# Patient Record
Sex: MALE | Race: WHITE | Employment: FULL TIME | URBAN - METROPOLITAN AREA
[De-identification: names, ages, dates, MRNs, and addresses within clinical notes are randomized per-mention and may not be internally consistent; named-entity substitution may affect disease eponyms.]

---

## 2017-08-14 ENCOUNTER — HOSPITAL ENCOUNTER (OUTPATIENT)
Dept: MRI IMAGING | Age: 37
Discharge: HOME | End: 2017-08-14
Attending: ORTHOPAEDIC SURGERY
Payer: COMMERCIAL

## 2017-08-14 ENCOUNTER — HOSPITAL ENCOUNTER (OUTPATIENT)
Dept: GENERAL RADIOLOGY | Age: 37
Discharge: HOME | End: 2017-08-14
Attending: ORTHOPAEDIC SURGERY
Payer: COMMERCIAL

## 2017-08-14 DIAGNOSIS — M25.512 LEFT SHOULDER PAIN: ICD-10-CM

## 2017-08-14 PROCEDURE — 74011250636 HC RX REV CODE- 250/636: Performed by: RADIOLOGY

## 2017-08-14 PROCEDURE — A9585 GADOBUTROL INJECTION: HCPCS | Performed by: RADIOLOGY

## 2017-08-14 PROCEDURE — 73222 MRI JOINT UPR EXTREM W/DYE: CPT

## 2017-08-14 PROCEDURE — 74011000250 HC RX REV CODE- 250: Performed by: RADIOLOGY

## 2017-08-14 PROCEDURE — 23350 INJECTION FOR SHOULDER X-RAY: CPT

## 2017-08-14 PROCEDURE — 74011636320 HC RX REV CODE- 636/320: Performed by: RADIOLOGY

## 2017-08-14 RX ADMIN — IOHEXOL 20 ML: 300 INJECTION, SOLUTION INTRAVENOUS at 11:00

## 2017-08-14 RX ADMIN — SODIUM BICARBONATE 5 ML: 0.2 INJECTION, SOLUTION INTRAVENOUS at 11:00

## 2017-08-14 RX ADMIN — GADOBUTROL 10 ML: 604.72 INJECTION INTRAVENOUS at 11:00

## 2017-12-01 ENCOUNTER — OFFICE VISIT (OUTPATIENT)
Dept: INTERNAL MEDICINE CLINIC | Age: 37
End: 2017-12-01

## 2017-12-01 VITALS
RESPIRATION RATE: 18 BRPM | BODY MASS INDEX: 33.29 KG/M2 | SYSTOLIC BLOOD PRESSURE: 130 MMHG | OXYGEN SATURATION: 100 % | WEIGHT: 245.8 LBS | HEART RATE: 95 BPM | DIASTOLIC BLOOD PRESSURE: 79 MMHG | HEIGHT: 72 IN | TEMPERATURE: 98.2 F

## 2017-12-01 DIAGNOSIS — Z00.00 GENERAL MEDICAL EXAM: ICD-10-CM

## 2017-12-01 DIAGNOSIS — H66.91 RIGHT OTITIS MEDIA, UNSPECIFIED OTITIS MEDIA TYPE: ICD-10-CM

## 2017-12-01 DIAGNOSIS — F17.200 NEEDS SMOKING CESSATION EDUCATION: ICD-10-CM

## 2017-12-01 DIAGNOSIS — F17.200 SMOKING: Primary | ICD-10-CM

## 2017-12-01 DIAGNOSIS — J40 BRONCHITIS: ICD-10-CM

## 2017-12-01 DIAGNOSIS — Z13.220 SCREENING FOR CHOLESTEROL LEVEL: ICD-10-CM

## 2017-12-01 DIAGNOSIS — Z13.1 SCREENING FOR DIABETES MELLITUS: ICD-10-CM

## 2017-12-01 RX ORDER — BUPROPION HYDROCHLORIDE 150 MG/1
150 TABLET, EXTENDED RELEASE ORAL 2 TIMES DAILY
Qty: 60 TAB | Refills: 2 | Status: SHIPPED | OUTPATIENT
Start: 2017-12-01 | End: 2018-03-05 | Stop reason: SDUPTHER

## 2017-12-01 RX ORDER — AMOXICILLIN AND CLAVULANATE POTASSIUM 875; 125 MG/1; MG/1
1 TABLET, FILM COATED ORAL EVERY 12 HOURS
Qty: 14 TAB | Refills: 0 | Status: SHIPPED | OUTPATIENT
Start: 2017-12-01 | End: 2018-02-26 | Stop reason: ALTCHOICE

## 2017-12-01 NOTE — PROGRESS NOTES
Mr. Jairo Cortez is a new patient who is here to establish care. CC:  Establish Care and Cough (coughing yellowish green mucus x 1 month )       HPI:  Presenting to establish care. Patient also complains of URI symptoms. Having chronic cough yellow brown, wife recently treated for bronchitis. Patient reports \"Started with nasal symptoms and then now in lungs\"   Denies dyspnea, feels generally fatigued and drained. Onset of symptoms 1 month ago  Patient smokes 2 packs per week. Trying to quit. 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[facility]   One son 8 yo /  Wife's my patient also      Review of systems:  Constitutional: negative for fever, chills, weight loss, night sweats   Eyes : negative for vision changes, eye pain and discharge  Nose and Throat: negative for tinnitus, sore throat   Cardiovascular: negative for chest pain, palpitations and shortness of breath  Respiratory: See HPI  Gastroinstestinal: negative for abdominal pain, nausea, vomiting, diarrhea, constipation, and blood in the stool  Musculoskeletal: negative for back ache and joint ache   Genitourinary: negative for dysuria, nocturia, polyuria and hematuria   Neurologic: Negative for focal weakness, numbness or incoordination  Skin: negative for rash, pruritus  Hematologic: negative for easy bruising      Past Medical History:   Diagnosis Date    Tobacco abuse         Past Surgical History:   Procedure Laterality Date    HX OTHER SURGICAL      left shoulder xurgery bone spur    HX VASECTOMY         No Known Allergies    No current outpatient prescriptions on file prior to visit. No current facility-administered medications on file prior to visit. family history includes Cancer in his sister; Diabetes in his mother.     Social History     Social History    Marital status:      Spouse name: N/A    Number of children: N/A    Years of education: N/A     Occupational History    Not on file. Social History Main Topics    Smoking status: Current Some Day Smoker     Packs/day: 0.50    Smokeless tobacco: Never Used    Alcohol use 1.8 oz/week     1 Glasses of wine, 1 Cans of beer, 1 Shots of liquor per week      Comment: Once a day    Drug use: No    Sexual activity: Yes     Partners: Female     Birth control/ protection: None     Other Topics Concern    Not on file     Social History Narrative       Visit Vitals    /79 (BP 1 Location: Left arm, BP Patient Position: Sitting)    Pulse 95    Temp 98.2 °F (36.8 °C) (Oral)    Resp 18    Ht 6' 0.05\" (1.83 m)    Wt 245 lb 12.8 oz (111.5 kg)    SpO2 100%    BMI 33.29 kg/m2     General:  Well appearing male no acute distress  HEENT:   PERRL,normal conjunctiva. Right ear with redness on tympanic membrane. Left is normal. hearing normal to voice. Nose without edema or discharge, normal septum. Lips, teeth, gums normal.  Oropharynx: no erythema, no exudates, no lesions, normal tongue. Neck:  Supple. Thyroid normal size, nontender, without nodules. No carotid bruit. No masses or lymphadenopathy  Respiratory: no respiratory distress,  no wheezing, no rhonchi, no rales. No chest wall tenderness. Cardiovascular:  RRR, normal S1S2, no murmur. Gastrointestinal: normal bowel sounds, soft, nontender, without masses. No hepatosplenomegaly. Extremities +2 pulses, no edema, normal sensation   Musculoskeletal:  Normal gait. Normal digits and nails. Normal strength and tone, no atrophy, and no abnormal movement. Skin:  No rash, no lesions, no ulcers. Skin warm, normal turgor, without induration or nodules. Neuro:  A and OX4, fluent speech, cranial nerves normal 2-12. Sensation normal to light touch.   DTR symmetrical  Psych:  Normal affect      No results found for: WBC, WBCLT, HGBPOC, HGB, HGBP, HCTPOC, HCT, PHCT, RBCH, PLT, MCV, HGBEXT, HCTEXT, PLTEXT, HGBEXT, HCTEXT, PLTEXT  No results found for: NA, K, CL, CO2, AGAP, GLU, BUN, CREA, BUCR, GFRAA, GFRNA, CA, GFRAA  No results found for: CHOL, CHOLPOCT, CHOLX, CHLST, CHOLV, HDL, HDLPOC, LDL, LDLCPOC, LDLC, DLDLP, VLDLC, VLDL, TGLX, TRIGL, TRIGP, TGLPOCT, CHHD, CHHDX  No results found for: TSH, TSH2, TSH3, TSHP, TSHEXT, TSHEXT  No results found for: HBA1C, HGBE8, VHF6GLEC, DIL5TMCT, MHV6RJLO  No results found for: VITD3, XQVID2, XQVID3, XQVID, VD3RIA                Assessment and Plan:     Smoking  Patient wants to quit prescribed Wellbutrin. Previously he was successful quitting on Wellbutrin  - buPROPion SR (WELLBUTRIN SR) 150 mg SR tablet; Take 1 Tab by mouth two (2) times a day. Dispense: 60 Tab; Refill: 2  - CBC WITH AUTOMATED DIFF  - METABOLIC PANEL, COMPREHENSIVE       Bronchitis  Symptoms for over 4 weeks patient with a history of tobacco abuse. - amoxicillin-clavulanate (AUGMENTIN) 875-125 mg per tablet; Take 1 Tab by mouth every twelve (12) hours. Dispense: 14 Tab; Refill: 0  Advised to call if symptoms do not improve   Right otitis media, unspecified otitis media type  - amoxicillin-clavulanate (AUGMENTIN) 875-125 mg per tablet; Take 1 Tab by mouth every twelve (12) hours. Dispense: 14 Tab; Refill: 0     Screening for cholesterol level  - LIPID PANEL     Screening for diabetes mellitus  - HEMOGLOBIN A1C WITH EAG    General medical exam  - CBC WITH AUTOMATED DIFF  - METABOLIC PANEL, COMPREHENSIVE    Follow-up Disposition:  Return in about 6 months (around 6/1/2018) for smoking cessation .      Celso Farley MD

## 2017-12-01 NOTE — LETTER
1/2/2018 12:46 PM 
 
Mr. Alejandrina Munoz [address] [facility] [address] [id number] Dear Alejandrina Munoz: 
 
Please find your most recent results below. Resulted Orders CBC WITH AUTOMATED DIFF Result Value Ref Range WBC 4.2 3.4 - 10.8 x10E3/uL  
 RBC 5.11 4.14 - 5.80 x10E6/uL HGB 16.2 13.0 - 17.7 g/dL HCT 46.3 37.5 - 51.0 % MCV 91 79 - 97 fL  
 MCH 31.7 26.6 - 33.0 pg  
 MCHC 35.0 31.5 - 35.7 g/dL  
 RDW 13.7 12.3 - 15.4 % PLATELET 376 693 - 557 x10E3/uL NEUTROPHILS 57 Not Estab. % Lymphocytes 24 Not Estab. % MONOCYTES 14 Not Estab. % EOSINOPHILS 5 Not Estab. % BASOPHILS 0 Not Estab. %  
 ABS. NEUTROPHILS 2.4 1.4 - 7.0 x10E3/uL Abs Lymphocytes 1.0 0.7 - 3.1 x10E3/uL  
 ABS. MONOCYTES 0.6 0.1 - 0.9 x10E3/uL  
 ABS. EOSINOPHILS 0.2 0.0 - 0.4 x10E3/uL  
 ABS. BASOPHILS 0.0 0.0 - 0.2 x10E3/uL IMMATURE GRANULOCYTES 0 Not Estab. %  
 ABS. IMM. GRANS. 0.0 0.0 - 0.1 x10E3/uL Narrative Performed at:  [address]  [id number] : Apolinar Sanderson MD, Phone:  [phone] LIPID PANEL Result Value Ref Range Cholesterol, total 239 (H) 100 - 199 mg/dL Triglyceride 156 (H) 0 - 149 mg/dL HDL Cholesterol 36 (L) >39 mg/dL VLDL, calculated 31 5 - 40 mg/dL LDL, calculated 172 (H) 0 - 99 mg/dL Narrative Performed at:  [address]  [id number] : Apolinar Sanderson MD, Phone:  [phone] HEMOGLOBIN A1C WITH EAG Result Value Ref Range Hemoglobin A1c 5.0 4.8 - 5.6 % Comment:  
            Pre-diabetes: 5.7 - 6.4 Diabetes: >6.4 Glycemic control for adults with diabetes: <7.0 Estimated average glucose 97 mg/dL Narrative Performed at:  [address]  [id number] : Apolinar Sanderson MD, Phone:  [phone] METABOLIC PANEL, COMPREHENSIVE  
 Result Value Ref Range Glucose 92 65 - 99 mg/dL BUN 8 6 - 20 mg/dL Creatinine 0.93 0.76 - 1.27 mg/dL GFR est non- >59 mL/min/1.73 GFR est  >59 mL/min/1.73  
 BUN/Creatinine ratio 9 9 - 20 Sodium 140 134 - 144 mmol/L Potassium 4.4 3.5 - 5.2 mmol/L Chloride 100 96 - 106 mmol/L  
 CO2 24 18 - 29 mmol/L Calcium 9.8 8.7 - 10.2 mg/dL Protein, total 7.3 6.0 - 8.5 g/dL Albumin 4.4 3.5 - 5.5 g/dL GLOBULIN, TOTAL 2.9 1.5 - 4.5 g/dL A-G Ratio 1.5 1.2 - 2.2 Bilirubin, total 0.6 0.0 - 1.2 mg/dL Alk. phosphatase 74 39 - 117 IU/L  
 AST (SGOT) 33 0 - 40 IU/L  
 ALT (SGPT) 50 (H) 0 - 44 IU/L Narrative Performed at:  [address]  [id number] : Travon Linares MD, Phone:  [phone] RECOMMENDATIONS: 
Kidney function is normal  
Liver function shows ALT is mildly elevated - this is close to normal range repeat at follow up apt Testing for diabetes is negative Cholesterol - Cholesterol: Triglycerides are high ( short term fat storage), HDL good cholesterol is at goal,  LDL which is bad cholesterol is elevated. Recommend increasing  exercise to 30 minutes daily and increased fiber intake - vegetables, fruits and oats and whole grain.  Decreasing fatty food intake. We will repeat cholesterol level in one year if still elevated may need medication Please call me if you have any questions: [phone] Sincerely, Cheikh Willett MD

## 2017-12-01 NOTE — MR AVS SNAPSHOT
Visit Information Date & Time Provider Department Dept. Phone Encounter #  
 12/1/2017  3:00 PM Haley Pozo [address] [place] [address],[address] [phone] [id number] Follow-up Instructions Return in about 6 months (around 6/1/2018) for smoking cessation . Your Appointments 12/1/2017  3:30 PM  
Any with MD Sánchez  
[facility] [address] [facility]) Appt Note: . [facility] [address] [address]  
[address] [facility] [address] [place] [address] Upcoming Health Maintenance Date Due DTaP/Tdap/Td series (2 - Td) 12/1/2024 Allergies as of 12/1/2017  Review Complete On: 12/1/2017 By: Rosalee Barrera LPN No Known Allergies Current Immunizations  Never Reviewed No immunizations on file. Not reviewed this visit You Were Diagnosed With   
  
 Codes Comments Smoking    -  Primary ICD-10-CM: Z04.304 ICD-9-CM: 305.1 Needs smoking cessation education     ICD-10-CM: F17.200 ICD-9-CM: V62.3 Bronchitis     ICD-10-CM: J40 ICD-9-CM: 275 Right otitis media, unspecified otitis media type     ICD-10-CM: H66.91 
ICD-9-CM: 382.9 Screening for cholesterol level     ICD-10-CM: Z13.220 ICD-9-CM: V77.91 Screening for diabetes mellitus     ICD-10-CM: Z13.1 ICD-9-CM: V77.1 General medical exam     ICD-10-CM: Z00.00 ICD-9-CM: V70.9 Vitals BP Pulse Temp Resp Height(growth percentile) Weight(growth percentile) 130/79 (BP 1 Location: Left arm, BP Patient Position: Sitting) 95 98.2 °F (36.8 °C) (Oral) 18 6' 0.05\" (1.83 m) 245 lb 12.8 oz (111.5 kg) SpO2 BMI Smoking Status 100% 33.29 kg/m2 Current Some Day Smoker BMI and BSA Data Body Mass Index Body Surface Area  
 33.29 kg/m 2 2.38 m 2 Preferred Pharmacy Pharmacy Name Phone  [id number] [address], [place] AT AT [facility] [phone] Your Updated Medication List  
  
   
This list is accurate as of: 12/1/17  3:24 PM.  Always use your most recent med list.  
  
  
  
  
 amoxicillin-clavulanate 875-125 mg per tablet Commonly known as:  AUGMENTIN Take 1 Tab by mouth every twelve (12) hours. buPROPion  mg SR tablet Commonly known as:  Andrés Bhat Take 1 Tab by mouth two (2) times a day. Prescriptions Sent to Pharmacy Refills buPROPion SR (WELLBUTRIN SR) 150 mg SR tablet 2 Sig: Take 1 Tab by mouth two (2) times a day. Class: Normal  
 Pharmacy: [address] Ph #: [phone] Route: Oral  
 amoxicillin-clavulanate (AUGMENTIN) 875-125 mg per tablet 0 Sig: Take 1 Tab by mouth every twelve (12) hours. Class: Normal  
 Pharmacy: [address] Ph #: [phone] Route: Oral  
  
We Performed the Following CBC WITH AUTOMATED DIFF [83260 CPT(R)] HEMOGLOBIN A1C WITH EAG [05598 CPT(R)] LIPID PANEL [78055 CPT(R)] METABOLIC PANEL, COMPREHENSIVE [30102 CPT(R)] Follow-up Instructions Return in about 6 months (around 6/1/2018) for smoking cessation . Patient Instructions Stopping Smoking: Care Instructions Your Care Instructions Cigarette smokers crave the nicotine in cigarettes. Giving it up is much harder than simply changing a habit. Your body has to stop craving the nicotine. It is hard to quit, but you can do it. There are many tools that people use to quit smoking. You may find that combining tools works best for you. There are several steps to quitting. First you get ready to quit. Then you get support to help you. After that, you learn new skills and behaviors to become a nonsmoker. For many people, a necessary step is getting and using medicine. Your doctor will help you set up the plan that best meets your needs. You may want to attend a smoking cessation program to help you quit smoking. When you choose a program, look for one that has proven success. Ask your doctor for ideas. You will greatly increase your chances of success if you take medicine as well as get counseling or join a cessation program. 
Some of the changes you feel when you first quit tobacco are uncomfortable. Your body will miss the nicotine at first, and you may feel short-tempered and grumpy. You may have trouble sleeping or concentrating. Medicine can help you deal with these symptoms. You may struggle with changing your smoking habits and rituals. The last step is the tricky one: Be prepared for the smoking urge to continue for a time. This is a lot to deal with, but keep at it. You will feel better. Follow-up care is a key part of your treatment and safety. Be sure to make and go to all appointments, and call your doctor if you are having problems. It's also a good idea to know your test results and keep a list of the medicines you take. How can you care for yourself at home? · Ask your family, friends, and coworkers for support. You have a better chance of quitting if you have help and support. · Join a support group, such as Nicotine Anonymous, for people who are trying to quit smoking. · Consider signing up for a smoking cessation program, such as the American Lung Association's Freedom from Smoking program. 
· Set a quit date. Pick your date carefully so that it is not right in the middle of a big deadline or stressful time. Once you quit, do not even take a puff. Get rid of all ashtrays and lighters after your last cigarette. Clean your house and your clothes so that they do not smell of smoke. · Learn how to be a nonsmoker. Think about ways you can avoid those things that make you reach for a cigarette. ¨ Avoid situations that put you at greatest risk for smoking. For some people, it is hard to have a drink with friends without smoking. For others, they might skip a coffee break with coworkers who smoke. ¨ Change your daily routine. Take a different route to work or eat a meal in a different place. · Cut down on stress. Calm yourself or release tension by doing an activity you enjoy, such as reading a book, taking a hot bath, or gardening. · Talk to your doctor or pharmacist about nicotine replacement therapy, which replaces the nicotine in your body. You still get nicotine but you do not use tobacco. Nicotine replacement products help you slowly reduce the amount of nicotine you need. These products come in several forms, many of them available over-the-counter: ¨ Nicotine patches ¨ Nicotine gum and lozenges ¨ Nicotine inhaler · Ask your doctor about bupropion (Wellbutrin) or varenicline (Chantix), which are prescription medicines. They do not contain nicotine. They help you by reducing withdrawal symptoms, such as stress and anxiety. · Some people find hypnosis, acupuncture, and massage helpful for ending the smoking habit. · Eat a healthy diet and get regular exercise. Having healthy habits will help your body move past its craving for nicotine. · Be prepared to keep trying. Most people are not successful the first few times they try to quit. Do not get mad at yourself if you smoke again. Make a list of things you learned and think about when you want to try again, such as next week, next month, or next year. Where can you learn more? Go to http://michelle-kirk.info/. Enter P499 in the search box to learn more about \"Stopping Smoking: Care Instructions. \" Current as of: March 20, 2017 Content Version: 11.4 © 1437-4715 [employer], [employer].  Care instructions adapted under license by [employer] (which disclaims liability or warranty for this information). If you have questions about a medical condition or this instruction, always ask your healthcare professional. [address] any warranty or liability for your use of this information. Introducing \[facility]\"" HEALTH SERVICES! [facility] introduces InstaEDU patient portal. Now you can access parts of your medical record, email your doctor's office, and request medication refills online. 1. In your internet browser, go to [email or web address] 2. Click on the First Time User? Click Here link in the Sign In box. You will see the New Member Sign Up page. 3. Enter your InstaEDU Access Code exactly as it appears below. You will not need to use this code after youve completed the sign-up process. If you do not sign up before the expiration date, you must request a new code. · InstaEDU Access Code: [email or web address] Expires: 3/1/2018  3:24 PM 
 
4. Enter the last four digits of your Social Security Number (xxxx) and Date of Birth (mm/dd/yyyy) as indicated and click Submit. You will be taken to the next sign-up page. 5. Create a InstaEDU ID. This will be your InstaEDU login ID and cannot be changed, so think of one that is secure and easy to remember. 6. Create a InstaEDU password. You can change your password at any time. 7. Enter your Password Reset Question and Answer. This can be used at a later time if you forget your password. 8. Enter your e-mail address. You will receive e-mail notification when new information is available in [address] 9. Click Sign Up. You can now view and download portions of your medical record. 10. Click the Download Summary menu link to download a portable copy of your medical information. If you have questions, please visit the Frequently Asked Questions section of the InstaEDU website. Remember, InstaEDU is NOT to be used for urgent needs. For medical emergencies, dial 911. Now available from your iPhone and Android! Please provide this summary of care documentation to your next provider. Your primary care clinician is listed as VINCENZO Patel. If you have any questions after today's visit, please call [phone].

## 2017-12-01 NOTE — PATIENT INSTRUCTIONS

## 2017-12-01 NOTE — PROGRESS NOTES
Chief Complaint   Patient presents with    Establish Care    Cough     coughing yellowish green mucus x 1 month      Visit Vitals    /79 (BP 1 Location: Left arm, BP Patient Position: Sitting)    Pulse 95    Temp 98.2 °F (36.8 °C) (Oral)    Resp 18    Ht 6' 0.05\" (1.83 m)    Wt 245 lb 12.8 oz (111.5 kg)    SpO2 100%    BMI 33.29 kg/m2     Reviewed record In preparation for visit and have obtained necessary documentation    1. Have you been to the ER, urgent care clinic since your last visit? Hospitalized since your last visit? NO    2. Have you seen or consulted any other health care providers outside of the [address] [place] [address] since your last visit? Include any pap smears or colon screening. NO    Patient does not have advance directive on file and has received paperwork.

## 2017-12-29 ENCOUNTER — APPOINTMENT (OUTPATIENT)
Dept: INTERNAL MEDICINE CLINIC | Age: 37
End: 2017-12-29

## 2017-12-30 LAB
ALBUMIN SERPL-MCNC: 4.4 G/DL (ref 3.5–5.5)
ALBUMIN/GLOB SERPL: 1.5 {RATIO} (ref 1.2–2.2)
ALP SERPL-CCNC: 74 IU/L (ref 39–117)
ALT SERPL-CCNC: 50 IU/L (ref 0–44)
AST SERPL-CCNC: 33 IU/L (ref 0–40)
BASOPHILS # BLD AUTO: 0 X10E3/UL (ref 0–0.2)
BASOPHILS NFR BLD AUTO: 0 %
BILIRUB SERPL-MCNC: 0.6 MG/DL (ref 0–1.2)
BUN SERPL-MCNC: 8 MG/DL (ref 6–20)
BUN/CREAT SERPL: 9 (ref 9–20)
CALCIUM SERPL-MCNC: 9.8 MG/DL (ref 8.7–10.2)
CHLORIDE SERPL-SCNC: 100 MMOL/L (ref 96–106)
CHOLEST SERPL-MCNC: 239 MG/DL (ref 100–199)
CO2 SERPL-SCNC: 24 MMOL/L (ref 18–29)
CREAT SERPL-MCNC: 0.93 MG/DL (ref 0.76–1.27)
EOSINOPHIL # BLD AUTO: 0.2 X10E3/UL (ref 0–0.4)
EOSINOPHIL NFR BLD AUTO: 5 %
ERYTHROCYTE [DISTWIDTH] IN BLOOD BY AUTOMATED COUNT: 13.7 % (ref 12.3–15.4)
EST. AVERAGE GLUCOSE BLD GHB EST-MCNC: 97 MG/DL
GLOBULIN SER CALC-MCNC: 2.9 G/DL (ref 1.5–4.5)
GLUCOSE SERPL-MCNC: 92 MG/DL (ref 65–99)
HBA1C MFR BLD: 5 % (ref 4.8–5.6)
HCT VFR BLD AUTO: 46.3 % (ref 37.5–51)
HDLC SERPL-MCNC: 36 MG/DL
HGB BLD-MCNC: 16.2 G/DL (ref 13–17.7)
IMM GRANULOCYTES # BLD: 0 X10E3/UL (ref 0–0.1)
IMM GRANULOCYTES NFR BLD: 0 %
LDLC SERPL CALC-MCNC: 172 MG/DL (ref 0–99)
LYMPHOCYTES # BLD AUTO: 1 X10E3/UL (ref 0.7–3.1)
LYMPHOCYTES NFR BLD AUTO: 24 %
MCH RBC QN AUTO: 31.7 PG (ref 26.6–33)
MCHC RBC AUTO-ENTMCNC: 35 G/DL (ref 31.5–35.7)
MCV RBC AUTO: 91 FL (ref 79–97)
MONOCYTES # BLD AUTO: 0.6 X10E3/UL (ref 0.1–0.9)
MONOCYTES NFR BLD AUTO: 14 %
NEUTROPHILS # BLD AUTO: 2.4 X10E3/UL (ref 1.4–7)
NEUTROPHILS NFR BLD AUTO: 57 %
PLATELET # BLD AUTO: 245 X10E3/UL (ref 150–379)
POTASSIUM SERPL-SCNC: 4.4 MMOL/L (ref 3.5–5.2)
PROT SERPL-MCNC: 7.3 G/DL (ref 6–8.5)
RBC # BLD AUTO: 5.11 X10E6/UL (ref 4.14–5.8)
SODIUM SERPL-SCNC: 140 MMOL/L (ref 134–144)
TRIGL SERPL-MCNC: 156 MG/DL (ref 0–149)
VLDLC SERPL CALC-MCNC: 31 MG/DL (ref 5–40)
WBC # BLD AUTO: 4.2 X10E3/UL (ref 3.4–10.8)

## 2017-12-31 NOTE — PROGRESS NOTES
Kidney function is normal   Liver function shows ALT is mildly elevated - this is close to normal range repeat at follow up apt  Testing for diabetes is negative  Cholesterol - Cholesterol: Triglycerides are high ( short term fat storage), HDL good cholesterol is at goal,  LDL which is bad cholesterol is elevated. Recommend increasing  exercise to 30 minutes daily and increased fiber intake - vegetables, fruits and oats and whole grain. Decreasing fatty food intake.  We will repeat cholesterol level in one year if still elevated may need medication

## 2018-02-26 ENCOUNTER — OFFICE VISIT (OUTPATIENT)
Dept: INTERNAL MEDICINE CLINIC | Age: 38
End: 2018-02-26

## 2018-02-26 VITALS
OXYGEN SATURATION: 99 % | DIASTOLIC BLOOD PRESSURE: 83 MMHG | RESPIRATION RATE: 18 BRPM | BODY MASS INDEX: 32.91 KG/M2 | TEMPERATURE: 97.8 F | HEIGHT: 72 IN | SYSTOLIC BLOOD PRESSURE: 133 MMHG | HEART RATE: 99 BPM | WEIGHT: 243 LBS

## 2018-02-26 DIAGNOSIS — R74.01 ELEVATED ALT MEASUREMENT: ICD-10-CM

## 2018-02-26 DIAGNOSIS — E78.00 HIGH CHOLESTEROL: ICD-10-CM

## 2018-02-26 DIAGNOSIS — M77.9 TENDINITIS: Primary | ICD-10-CM

## 2018-02-26 RX ORDER — NAPROXEN 500 MG/1
500 TABLET ORAL 2 TIMES DAILY WITH MEALS
Qty: 30 TAB | Refills: 1 | Status: SHIPPED | OUTPATIENT
Start: 2018-02-26 | End: 2021-10-12

## 2018-02-26 NOTE — MR AVS SNAPSHOT
[address] [facility] 
[phone] Patient: Dewey Rasmussen MRN: [id number] HTB:[id number] Visit Information Date & Time Provider Department Dept. Phone Encounter #  
 2/26/2018  9:15 AM Sánchez, [address] [facility] [phone] [id number] Follow-up Instructions Return in about 6 months (around 8/26/2018) for cholesterol. Your Appointments 6/1/2018  9:45 AM  
ROUTINE CARE with Hilda Payne MD  
[facility]) Appt Note: 6 month follow up  
 [address] [address]  
[address] [place] [address] [address] [place] [address] Upcoming Health Maintenance Date Due DTaP/Tdap/Td series (2 - Td) 12/1/2024 Allergies as of 2/26/2018  Review Complete On: 2/26/2018 By: MD Sánchez  
 No Known Allergies Current Immunizations  Never Reviewed No immunizations on file. Not reviewed this visit You Were Diagnosed With   
  
 Codes Comments Tendinitis    -  Primary ICD-10-CM: M77.9 ICD-9-CM: 726.90 High cholesterol     ICD-10-CM: E78.00 ICD-9-CM: 272.0 Elevated ALT measurement     ICD-10-CM: R74.0 ICD-9-CM: 790.4 Vitals BP Pulse Temp Resp Height(growth percentile) Weight(growth percentile) 133/83 (BP 1 Location: Right arm, BP Patient Position: Sitting) 99 97.8 °F (36.6 °C) (Oral) 18 6' 0.05\" (1.83 m) 243 lb (110.2 kg) SpO2 BMI Smoking Status 99% 32.91 kg/m2 Former Smoker BMI and BSA Data Body Mass Index Body Surface Area  
 32.91 kg/m 2 2.37 m 2 Preferred Pharmacy Pharmacy Name Phone [id number] [phone] Your Updated Medication List  
  
   
 This list is accurate as of 2/26/18  9:31 AM.  Always use your most recent med list.  
  
  
  
  
 buPROPion  mg SR tablet Commonly known as:  Mukul Hilts Take 1 Tab by mouth two (2) times a day. naproxen 500 mg tablet Commonly known as:  NAPROSYN Take 1 Tab by mouth two (2) times daily (with meals). For one week/ take it with food Prescriptions Sent to Pharmacy Refills  
 naproxen (NAPROSYN) 500 mg tablet 1 Sig: Take 1 Tab by mouth two (2) times daily (with meals). For one week/ take it with food Class: Normal  
 Pharmacy: [place] [id number], [address] [place] [address] #: [phone] Route: Oral  
  
Follow-up Instructions Return in about 6 months (around 8/26/2018) for cholesterol. To-Do List   
 02/26/2018 Lab:  LIPID PANEL   
  
 02/26/2018 Lab:  METABOLIC PANEL, COMPREHENSIVE Patient Instructions Wrist Tendinitis: Exercises Your Care Instructions Here are some examples of typical rehabilitation exercises for your condition. Start each exercise slowly. Ease off the exercise if you start to have pain. Your doctor or your physical or occupational therapist will tell you when you can start these exercises and which ones will work best for you. How to do the exercises Wrist flexion and extension 1. Place your forearm on a table, with your hand and affected wrist extended beyond the table, palm down. 2. Bend your wrist to move your hand upward and allow your hand to close into a fist, then lower your hand and allow your fingers to relax. Hold each position for about 6 seconds. 3. Repeat 8 to 12 times. Hand flips 1. While seated, place your forearm and affected wrist on your thigh, palm down. 2. Flip your hand over so the back of your hand rests on your thigh and your palm is up. Alternate between palm up and palm down while keeping your forearm on your thigh. 3. Repeat 8 to 12 times. Wrist radial and ulnar deviation 1. Hold your affected hand out in front of you, palm down. 2. Slowly bend your wrist as far as you can from side to side. Hold each position for about 6 seconds. 3. Repeat 8 to 12 times. Wrist extensor stretch 1. Extend the arm with the affected wrist in front of you and point your fingers toward the floor. 2. With your other hand, gently bend your wrist farther until you feel a mild to moderate stretch in your forearm. 3. Hold the stretch for at least 15 to 30 seconds. 4. Repeat 2 to 4 times. 5. When you can do this stretch with ease and no pain, repeat steps 1 through 4. But this time extend your affected arm in front of you and make a fist with your palm facing down. Then bend your wrist, pointing your fist toward the floor. Wrist flexor stretch 1. Extend the arm with the affected wrist in front of you with your palm facing away from your body. 2. Bend back your wrist, pointing your hand up toward the ceiling. 3. With your other hand, gently bend your wrist farther until you feel a mild to moderate stretch in your forearm. 4. Hold the stretch for at least 15 to 30 seconds. 5. Repeat 2 to 4 times. 6. Repeat steps 1 through 5, but this time extend your affected arm in front of you with your palm facing up. Then bend back your wrist, pointing your hand toward the floor. Follow-up care is a key part of your treatment and safety. Be sure to make and go to all appointments, and call your doctor if you are having problems. It's also a good idea to know your test results and keep a list of the medicines you take. Where can you learn more? Go to http://michelle-kirk.info/. Enter C662 in the search box to learn more about \"Wrist Tendinitis: Exercises. \" Current as of: March 21, 2017 Content Version: 11.4 © 9783-7457 Healthwise, Incorporated.  Care instructions adapted under license by Eloy Patel (which disclaims liability or warranty for this information). If you have questions about a medical condition or this instruction, always ask your healthcare professional. [address] any warranty or liability for your use of this information. 
-------------------------------------- Congratulation on quitting smoking Repeat cholesterol and liver enzymes in June Introducing [facility]! Dear Rian Cuadra: Thank you for requesting a [employer] account. Our records indicate that you already have an active [employer] account. You can access your account anytime at [email or web address] Did you know that you can access your hospital and ER discharge instructions at any time in 51[employer]? You can also review all of your test results from your hospital stay or ER visit. Additional Information If you have questions, please visit the Frequently Asked Questions section of the [employer] website at [email or web address] Remember, 51[employer] is NOT to be used for urgent needs. For medical emergencies, dial 911. Now available from your iPhone and Android! Please provide this summary of care documentation to your next provider. Your primary care clinician is listed as VINCENZO Patel. If you have any questions after today's visit, please call [phone].

## 2018-02-26 NOTE — PROGRESS NOTES
CC: Hand Pain (both hands) and Warts (left hand pointer finger)      HPI:    He is a 40 y.o. male who presents for evaluation of hand pain and warts    He las seen on December and at that time noted to have elevated cholesterol LDL was 170. Has implemeted lifestyle changes and eating lower fat diet. Tobacco abuse: able to quit. Patient is taking Wellbutrin and has stopped smoking. Congratulated patient    Hand pain- noted two weeks of pain in his left hand mostly - points to dorsal aspect / extensor tendons  Dawit stiffness and swelling. Noted pain for two weeks then stopped hurting - currently denies pain  Took ibuprofen - unsure if helped  Denies trauma but works with his hands    Hx of warts in hands and has noted warts below nail in both hands    ROS:  10 systems reviewed and negative other than HPI    Past Medical History:   Diagnosis Date    Tobacco abuse        Current Outpatient Prescriptions on File Prior to Visit   Medication Sig Dispense Refill    buPROPion SR (WELLBUTRIN SR) 150 mg SR tablet Take 1 Tab by mouth two (2) times a day. 60 Tab 2     No current facility-administered medications on file prior to visit. Past Surgical History:   Procedure Laterality Date    HX OTHER SURGICAL      left shoulder xurgery bone spur    HX VASECTOMY         Family History   Problem Relation Age of Onset    Diabetes Mother     Cancer Sister      breast      Reviewed and no changes     Social History     Social History    Marital status:      Spouse name: N/A    Number of children: N/A    Years of education: N/A     Occupational History    Not on file.      Social History Main Topics    Smoking status: Former Smoker     Packs/day: 0.50    Smokeless tobacco: Never Used    Alcohol use 1.8 oz/week     1 Glasses of wine, 1 Cans of beer, 1 Shots of liquor per week      Comment: Once a day    Drug use: No    Sexual activity: Yes     Partners: Female     Birth control/ protection: None     Other Topics Concern    Not on file     Social History Narrative            Visit Vitals    /83 (BP 1 Location: Right arm, BP Patient Position: Sitting)    Pulse 99    Temp 97.8 °F (36.6 °C) (Oral)    Resp 18    Ht 6' 0.05\" (1.83 m)    Wt 243 lb (110.2 kg)    SpO2 99%    BMI 32.91 kg/m2       Physical Examination:   General - Well appearing male  HEENT - PERRL, TM no erythema/opacification, normal nasal turbinates, oropharynx no erythema or exudate, MMM  Neck - supple, no bruits, no TMG, no LAD  Pulm - clear to auscultation bilaterally  Cardio - RRR, normal S1 S2, no murmur gallops or rubs  Abd - soft, nontender, no masses, no HSM  Extrem - no edema, +2 distal pulses  Psych - normal affect, appropriate mood  Hand exam is normal, no tenderness over wrists, MCPs and DIPS and PIPS   Skin: wart on left first digit and right digit    Lab Results   Component Value Date/Time    WBC 4.2 12/29/2017 08:27 AM    HGB 16.2 12/29/2017 08:27 AM    HCT 46.3 12/29/2017 08:27 AM    PLATELET 186 07/58/7972 08:27 AM    MCV 91 12/29/2017 08:27 AM     Lab Results   Component Value Date/Time    Sodium 140 12/29/2017 08:27 AM    Potassium 4.4 12/29/2017 08:27 AM    Chloride 100 12/29/2017 08:27 AM    CO2 24 12/29/2017 08:27 AM    Glucose 92 12/29/2017 08:27 AM    BUN 8 12/29/2017 08:27 AM    Creatinine 0.93 12/29/2017 08:27 AM    BUN/Creatinine ratio 9 12/29/2017 08:27 AM    GFR est  12/29/2017 08:27 AM    GFR est non- 12/29/2017 08:27 AM    Calcium 9.8 12/29/2017 08:27 AM     Lab Results   Component Value Date/Time    Cholesterol, total 239 (H) 12/29/2017 08:27 AM    HDL Cholesterol 36 (L) 12/29/2017 08:27 AM    LDL, calculated 172 (H) 12/29/2017 08:27 AM    VLDL, calculated 31 12/29/2017 08:27 AM    Triglyceride 156 (H) 12/29/2017 08:27 AM     No results found for: TSH, TSH2, TSH3, TSHP, TSHEXT, TSHEXT  No results found for: KAIT, Misti Burt, PSAR3, [id number], [id number], PSALT  Lab Results Component Value Date/Time    Hemoglobin A1c 5.0 12/29/2017 08:27 AM     No results found for: Brent Alvarado, [id number]    Lab Results   Component Value Date/Time    ALT (SGPT) 50 (H) 12/29/2017 08:27 AM    AST (SGOT) 33 12/29/2017 08:27 AM    Alk. phosphatase 74 12/29/2017 08:27 AM    Bilirubin, total 0.6 12/29/2017 08:27 AM           Assessment/Plan:    41 yo male with a hx of tobacco abuse, high cholesterol, tobacco abuse presenting with hand pain   1. High cholesterol  - patient to return for fasting labs in the summer  - LIPID PANEL; Future  - METABOLIC PANEL, COMPREHENSIVE; Future    2. Elevated ALT measurement  - METABOLIC PANEL, COMPREHENSIVE; Future    3. Tendinitis of left hand  -suspect related to over use  At work. Improving. Given exercises/ stretches. NSAID for 1 week. Advised to call if symptoms worsen  - naproxen (NAPROSYN) 500 mg tablet; Take 1 Tab by mouth two (2) times daily (with meals). For one week/ take it with food  Dispense: 30 Tab; Refill: 1    4. Tobacco abuse: quit smoking- congratulated patient. Currently on wellbutrin    Follow-up Disposition:  Return in about 6 months (around 8/26/2018) for cholesterol.     Vanessa Gifford MD

## 2018-02-26 NOTE — PROGRESS NOTES
Chief Complaint   Patient presents with    Hand Pain     both hands    Warts     left hand pointer finger     1. Have you been to the ER, urgent care clinic since your last visit? Hospitalized since your last visit? No    2. Have you seen or consulted any other health care providers outside of the [address] [place] [address] since your last visit? Include any pap smears or colon screening.  No

## 2018-02-26 NOTE — PATIENT INSTRUCTIONS
Wrist Tendinitis: Exercises  Your Care Instructions  Here are some examples of typical rehabilitation exercises for your condition. Start each exercise slowly. Ease off the exercise if you start to have pain. Your doctor or your physical or occupational therapist will tell you when you can start these exercises and which ones will work best for you. How to do the exercises  Wrist flexion and extension    1. Place your forearm on a table, with your hand and affected wrist extended beyond the table, palm down. 2. Bend your wrist to move your hand upward and allow your hand to close into a fist, then lower your hand and allow your fingers to relax. Hold each position for about 6 seconds. 3. Repeat 8 to 12 times. Hand flips    1. While seated, place your forearm and affected wrist on your thigh, palm down. 2. Flip your hand over so the back of your hand rests on your thigh and your palm is up. Alternate between palm up and palm down while keeping your forearm on your thigh. 3. Repeat 8 to 12 times. Wrist radial and ulnar deviation    1. Hold your affected hand out in front of you, palm down. 2. Slowly bend your wrist as far as you can from side to side. Hold each position for about 6 seconds. 3. Repeat 8 to 12 times. Wrist extensor stretch    1. Extend the arm with the affected wrist in front of you and point your fingers toward the floor. 2. With your other hand, gently bend your wrist farther until you feel a mild to moderate stretch in your forearm. 3. Hold the stretch for at least 15 to 30 seconds. 4. Repeat 2 to 4 times. 5. When you can do this stretch with ease and no pain, repeat steps 1 through 4. But this time extend your affected arm in front of you and make a fist with your palm facing down. Then bend your wrist, pointing your fist toward the floor. Wrist flexor stretch    1. Extend the arm with the affected wrist in front of you with your palm facing away from your body.   2. Bend back your wrist, pointing your hand up toward the ceiling. 3. With your other hand, gently bend your wrist farther until you feel a mild to moderate stretch in your forearm. 4. Hold the stretch for at least 15 to 30 seconds. 5. Repeat 2 to 4 times. 6. Repeat steps 1 through 5, but this time extend your affected arm in front of you with your palm facing up. Then bend back your wrist, pointing your hand toward the floor. Follow-up care is a key part of your treatment and safety. Be sure to make and go to all appointments, and call your doctor if you are having problems. It's also a good idea to know your test results and keep a list of the medicines you take. Where can you learn more? Go to http://michelle-kirk.info/. Enter I116 in the search box to learn more about \"Wrist Tendinitis: Exercises. \"  Current as of: March 21, 2017  Content Version: 11.4  © 3983-3062 [employer]. Care instructions adapted under license by [employer] (which disclaims liability or warranty for this information).  If you have questions about a medical condition or this instruction, always ask your healthcare professional. [place] [address] any warranty or liability for your use of this information.  --------------------------------------    Congratulation on quitting smoking     Repeat cholesterol and liver enzymes in June

## 2018-03-05 DIAGNOSIS — F17.200 SMOKING: ICD-10-CM

## 2018-03-05 DIAGNOSIS — F17.200 NEEDS SMOKING CESSATION EDUCATION: ICD-10-CM

## 2018-03-05 RX ORDER — BUPROPION HYDROCHLORIDE 150 MG/1
TABLET, EXTENDED RELEASE ORAL
Qty: 60 TAB | Refills: 2 | Status: SHIPPED | OUTPATIENT
Start: 2018-03-05 | End: 2018-07-08 | Stop reason: SDUPTHER

## 2018-07-08 DIAGNOSIS — F17.200 SMOKING: ICD-10-CM

## 2018-07-08 DIAGNOSIS — F17.200 NEEDS SMOKING CESSATION EDUCATION: ICD-10-CM

## 2018-07-09 RX ORDER — BUPROPION HYDROCHLORIDE 150 MG/1
TABLET, EXTENDED RELEASE ORAL
Qty: 60 TAB | Refills: 2 | Status: SHIPPED | OUTPATIENT
Start: 2018-07-09 | End: 2021-10-12

## 2021-10-12 ENCOUNTER — OFFICE VISIT (OUTPATIENT)
Dept: INTERNAL MEDICINE CLINIC | Age: 41
End: 2021-10-12
Payer: COMMERCIAL

## 2021-10-12 VITALS
SYSTOLIC BLOOD PRESSURE: 121 MMHG | HEART RATE: 84 BPM | HEIGHT: 72 IN | BODY MASS INDEX: 33.59 KG/M2 | TEMPERATURE: 97.7 F | RESPIRATION RATE: 14 BRPM | OXYGEN SATURATION: 99 % | DIASTOLIC BLOOD PRESSURE: 77 MMHG | WEIGHT: 248 LBS

## 2021-10-12 DIAGNOSIS — Z13.220 SCREENING, LIPID: ICD-10-CM

## 2021-10-12 DIAGNOSIS — R21 RASH: Primary | ICD-10-CM

## 2021-10-12 DIAGNOSIS — Z11.59 ENCOUNTER FOR HEPATITIS C SCREENING TEST FOR LOW RISK PATIENT: ICD-10-CM

## 2021-10-12 PROCEDURE — 99203 OFFICE O/P NEW LOW 30 MIN: CPT | Performed by: INTERNAL MEDICINE

## 2021-10-12 NOTE — PROGRESS NOTES
PROGRESS NOTE  Name: Maryjane Stanley   : 1980       ASSESSMENT/ PLAN:     Diagnoses and all orders for this visit:    Rash  -     REFERRAL TO DERMATOLOGY  -     METABOLIC PANEL, COMPREHENSIVE; Future  -     CBC WITH AUTOMATED DIFF; Future  -     SED RATE (ESR); Future  -     BASILIO BY MULTIPLEX FLOW IA, QL; Future  -     METABOLIC PANEL, COMPREHENSIVE  -     CBC WITH AUTOMATED DIFF  -     SED RATE (ESR)  -     BASILIO BY MULTIPLEX FLOW IA, QL    Screening, lipid  -     LIPID PANEL; Future  -     LIPID PANEL    Encounter for hepatitis C screening test for low risk patient  -     HEPATITIS C AB; Future  -     HEPATITIS C AB        Follow-up and Dispositions  ·   Return in about 1 year (around 10/12/2022) for CPE. I have reviewed the patient's medications and risks/side effects/benefits were discussed. Diagnosis(-es) explained to patient and questions answered. Literature provided where appropriate. SUBJECTIVE:   Mr. Maryjane Stanley is a 36 y.o. male who is here for follow up of routine medical issues. Chief Complaint   Patient presents with    New Patient       In the past year, he has developed a petechial rash, starting on back, and now spread to upper back, and legs. No itching. No known exposures to unusual or toxic agents. He has seen urologist for BPH. At this time, he is otherwise doing well and has brought no other complaints to my attention today. For a list of the medical issues addressed today, see the assessment and plan below. PMH:   Past Medical History:   Diagnosis Date    Tobacco abuse        Past Surgical History:   Procedure Laterality Date    HX OTHER SURGICAL      left shoulder xurgery bone spur    HX VASECTOMY         All: He has No Known Allergies.    Current Outpatient Medications   Medication Sig    buPROPion SR (WELLBUTRIN SR) 150 mg SR tablet TAKE 1 TABLET BY MOUTH TWICE A DAY (Patient not taking: Reported on 10/12/2021)    naproxen (NAPROSYN) 500 mg tablet Take 1 Tab by mouth two (2) times daily (with meals). For one week/ take it with food (Patient not taking: Reported on 10/12/2021)     No current facility-administered medications for this visit. FH: His family history includes Cancer in his sister; Diabetes in his mother. SH: He is an , for [address]. He reports that he has quit smoking. He smoked 0.50 packs per day. He has never used smokeless tobacco. He reports current alcohol use of about 3.0 standard drinks of alcohol per week. He reports that he does not use drugs. ROS: See above; Complete ROS otherwise negative. OBJECTIVE:   Vitals:   Visit Vitals  /77 (BP 1 Location: Left arm, BP Patient Position: Sitting, BP Cuff Size: Adult)   Pulse 84   Temp 97.7 °F (36.5 °C) (Temporal)   Resp 14   Ht 6' (1.829 m)   Wt 248 lb (112.5 kg)   SpO2 99%   BMI 33.63 kg/m²      Gen: Pleasant 36 y.o.  male in NAD. HEENT: PERRLA. EOMI. OP moist and pink. Neck: Supple. No LAD. HEART: RRR, No M/G/R.    LUNGS: CTAB No W/R. ABDOMEN: S, NT, ND, BS+. EXTREMITIES: Warm. No C/C/E.  MUSCULOSKELETAL: Normal ROM, muscle strength 5/5 all groups. NEURO: Alert and oriented x 3. Cranial nerves grossly intact. No focal sensory or motor deficits noted. SKIN: Warm. Dry. He has a macular rash of Low back prominently, with scattered lesions on upper back/shoulders, and proximal legs. Lab Results   Component Value Date/Time    Sodium 140 12/29/2017 08:27 AM    Potassium 4.4 12/29/2017 08:27 AM    Chloride 100 12/29/2017 08:27 AM    CO2 24 12/29/2017 08:27 AM    Glucose 92 12/29/2017 08:27 AM    BUN 8 12/29/2017 08:27 AM    Creatinine 0.93 12/29/2017 08:27 AM    BUN/Creatinine ratio 9 12/29/2017 08:27 AM    GFR est  12/29/2017 08:27 AM    GFR est non- 12/29/2017 08:27 AM    Calcium 9.8 12/29/2017 08:27 AM    Bilirubin, total 0.6 12/29/2017 08:27 AM    ALT (SGPT) 50 (H) 12/29/2017 08:27 AM    Alk.  phosphatase 74 12/29/2017 08:27 AM    Protein, total 7.3 12/29/2017 08:27 AM    Albumin 4.4 12/29/2017 08:27 AM    A-G Ratio 1.5 12/29/2017 08:27 AM       Lab Results   Component Value Date/Time    Cholesterol, total 239 (H) 12/29/2017 08:27 AM    HDL Cholesterol 36 (L) 12/29/2017 08:27 AM    LDL, calculated 172 (H) 12/29/2017 08:27 AM    Triglyceride 156 (H) 12/29/2017 08:27 AM        Lab Results   Component Value Date/Time    Hemoglobin A1c 5.0 12/29/2017 08:27 AM       Lab Results   Component Value Date/Time    WBC 4.2 12/29/2017 08:27 AM    HGB 16.2 12/29/2017 08:27 AM    HCT 46.3 12/29/2017 08:27 AM    PLATELET 323 72/88/2035 08:27 AM    MCV 91 12/29/2017 08:27 AM

## 2021-10-12 NOTE — PROGRESS NOTES
Chief Complaint   Patient presents with    New Patient     1. Have you been to the ER, urgent care clinic since your last visit? Hospitalized since your last visit? No    2. Have you seen or consulted any other health care providers outside of the [address] [place] [address] since your last visit? Include any pap smears or colon screening.  No     Visit Vitals  /77 (BP 1 Location: Left arm, BP Patient Position: Sitting, BP Cuff Size: Adult)   Pulse 84   Temp 97.7 °F (36.5 °C) (Temporal)   Resp 14   Ht 6' (1.829 m)   Wt 248 lb (112.5 kg)   SpO2 99%   BMI 33.63 kg/m²

## 2021-10-21 LAB
ALBUMIN SERPL-MCNC: 4.9 G/DL (ref 4–5)
ALBUMIN/GLOB SERPL: 2 {RATIO} (ref 1.2–2.2)
ALP SERPL-CCNC: 61 IU/L (ref 44–121)
ALT SERPL-CCNC: 43 IU/L (ref 0–44)
ANA SER QL: NEGATIVE
AST SERPL-CCNC: 32 IU/L (ref 0–40)
BASOPHILS # BLD AUTO: 0 X10E3/UL (ref 0–0.2)
BASOPHILS NFR BLD AUTO: 1 %
BILIRUB SERPL-MCNC: 0.6 MG/DL (ref 0–1.2)
BUN SERPL-MCNC: 13 MG/DL (ref 6–24)
BUN/CREAT SERPL: 13 (ref 9–20)
CALCIUM SERPL-MCNC: 10.1 MG/DL (ref 8.7–10.2)
CHLORIDE SERPL-SCNC: 102 MMOL/L (ref 96–106)
CHOLEST SERPL-MCNC: 225 MG/DL (ref 100–199)
CO2 SERPL-SCNC: 26 MMOL/L (ref 20–29)
CREAT SERPL-MCNC: 1.02 MG/DL (ref 0.76–1.27)
EOSINOPHIL # BLD AUTO: 0.1 X10E3/UL (ref 0–0.4)
EOSINOPHIL NFR BLD AUTO: 3 %
ERYTHROCYTE [DISTWIDTH] IN BLOOD BY AUTOMATED COUNT: 12.4 % (ref 11.6–15.4)
ERYTHROCYTE [SEDIMENTATION RATE] IN BLOOD BY WESTERGREN METHOD: 2 MM/HR (ref 0–15)
GLOBULIN SER CALC-MCNC: 2.5 G/DL (ref 1.5–4.5)
GLUCOSE SERPL-MCNC: 89 MG/DL (ref 65–99)
HCT VFR BLD AUTO: 47.2 % (ref 37.5–51)
HCV AB S/CO SERPL IA: <0.1 S/CO RATIO (ref 0–0.9)
HDLC SERPL-MCNC: 42 MG/DL
HGB BLD-MCNC: 16.6 G/DL (ref 13–17.7)
IMM GRANULOCYTES # BLD AUTO: 0 X10E3/UL (ref 0–0.1)
IMM GRANULOCYTES NFR BLD AUTO: 0 %
LDLC SERPL CALC-MCNC: 169 MG/DL (ref 0–99)
LYMPHOCYTES # BLD AUTO: 1.1 X10E3/UL (ref 0.7–3.1)
LYMPHOCYTES NFR BLD AUTO: 28 %
MCH RBC QN AUTO: 32.1 PG (ref 26.6–33)
MCHC RBC AUTO-ENTMCNC: 35.2 G/DL (ref 31.5–35.7)
MCV RBC AUTO: 91 FL (ref 79–97)
MONOCYTES # BLD AUTO: 0.4 X10E3/UL (ref 0.1–0.9)
MONOCYTES NFR BLD AUTO: 11 %
NEUTROPHILS # BLD AUTO: 2.4 X10E3/UL (ref 1.4–7)
NEUTROPHILS NFR BLD AUTO: 57 %
PLATELET # BLD AUTO: 278 X10E3/UL (ref 150–450)
POTASSIUM SERPL-SCNC: 4.9 MMOL/L (ref 3.5–5.2)
PROT SERPL-MCNC: 7.4 G/DL (ref 6–8.5)
RBC # BLD AUTO: 5.17 X10E6/UL (ref 4.14–5.8)
SODIUM SERPL-SCNC: 140 MMOL/L (ref 134–144)
TRIGL SERPL-MCNC: 81 MG/DL (ref 0–149)
VLDLC SERPL CALC-MCNC: 14 MG/DL (ref 5–40)
WBC # BLD AUTO: 4 X10E3/UL (ref 3.4–10.8)

## 2022-12-14 ENCOUNTER — OFFICE VISIT (OUTPATIENT)
Dept: PRIMARY CARE CLINIC | Age: 42
End: 2022-12-14

## 2022-12-14 VITALS
TEMPERATURE: 98.4 F | HEIGHT: 72 IN | DIASTOLIC BLOOD PRESSURE: 94 MMHG | HEART RATE: 99 BPM | WEIGHT: 262.8 LBS | SYSTOLIC BLOOD PRESSURE: 158 MMHG | RESPIRATION RATE: 16 BRPM | BODY MASS INDEX: 35.59 KG/M2 | OXYGEN SATURATION: 95 %

## 2022-12-14 NOTE — PROGRESS NOTES
Identified pt with two pt identifiers(name and ). Reviewed record in preparation for visit and have obtained necessary documentation. Chief Complaint   Patient presents with    Arm Pain     Left upper (bicep); heavy lifting this morning at work; felt/heard two pops; ice and ibu      Vitals:    22 1313   BP: (!) 158/94   Pulse: 99   Resp: 16   Temp: 98.4 °F (36.9 °C)   TempSrc: Temporal   SpO2: 95%   Weight: 262 lb 12.8 oz (119.2 kg)   Height: 6' (1.829 m)   PainSc:   7   PainLoc: Arm       Health Maintenance Review: Patient reminded of \"due or due soon\" health maintenance. I have asked the patient to contact his/her primary care provider (PCP) for follow-up on his/her health maintenance. Coordination of Care Questionnaire:  :   1) Have you been to an emergency room, urgent care, or hospitalized since your last visit? If yes, where when, and reason for visit? no       2. Have seen or consulted any other health care provider since your last visit? If yes, where when, and reason for visit? NO      Patient is accompanied by self I have received verbal consent from Liban Boggs to discuss any/all medical information while they are present in the room.

## 2022-12-19 NOTE — PERIOP NOTES
[facility]  Ambulatory Surgery Unit  Pre-operative Instructions    Surgery/Procedure Date  Thursday 12/22/2022            Tentative Arrival Time TBD      1. On the day of your surgery/procedure, please report to the Ambulatory Surgery Unit Registration Desk and sign in at your designated time. The Ambulatory Surgery Unit is located in [facility] on the [facility] side of the \[facility] [place] [facility]\"" across from the [address] [place] [address]. Please have all of your health insurance cards, co-payment, and a photo ID.    **TWO adults may accompany you the day of the procedure. We have limited seating available. If our waiting room is at capacity, your ride may be asked to remain in their vehicle. No one under 15 is allowed in the waiting room. 2. You must have someone with you to drive you home, as you should not drive a car for 24 hours following anesthesia. Please make arrangements for a responsible adult friend or family member to stay with you for at least the first 24 hours after your surgery. 3. Do not have anything to eat or drink (including water, gum, mints, coffee, juice) after 11:59 PM on Wednesday 12/21 . This may not apply to medications prescribed by your physician. (Please note below the special instructions with medications to take the morning of surgery, if applicable.)    4. We recommend you do not drink any alcoholic beverages for 24 hours before and after your surgery. 5. Contact your surgeons office for instructions on the following medications: non-steroidal anti-inflammatory drugs (i.e. Advil, Aleve), vitamins, and supplements. (Some surgeons will want you to stop these medications prior to surgery and others may allow you to take them)   **If you are currently taking Plavix, Coumadin, Aspirin and/or other blood-thinning agents, contact your surgeon for instructions. ** Your surgeon will partner with the physician prescribing these medications to determine if it is safe to stop or if you need to continue taking. Please do not stop taking these medications without instructions from your surgeon. 6. In an effort to help prevent surgical site infection, we ask that you shower with an anti-bacterial soap (i.e. Dial/Safeguard, or the soap provided to you at your preadmission testing appointment) for 3 days prior to and on the morning of surgery, using a fresh towel after each shower. (Please begin this process with fresh bed linens.) Do not apply any lotions, powders, or deodorants after the shower on the day of your procedure. If applicable, please do not shave the operative site for 48 hours prior to surgery. 7. Wear comfortable clothes. Wear glasses instead of contacts. Do not bring any jewelry or money (other than copays or fees as instructed). Do not wear make-up, particularly mascara, the morning of your surgery. Do not wear nail polish, particularly if you are having foot /hand surgery. Wear your hair loose or down, no ponytails, buns, tamara pins or clips. All body piercings must be removed. 8. You should understand that if you do not follow these instructions your surgery may be cancelled. If your physical condition changes (i.e. fever, cold or flu) please contact your surgeon as soon as possible. 9. It is important that you be on time. If a situation occurs where you may be late, or if you have any questions or problems, please call [phone].    10. Your surgery time may be subject to change. You will receive a phone call the day prior to surgery to confirm your arrival time. 11. Pediatric patients: please bring a change of clothes, diapers, bottle/sippy cup, pacifier, etc.      Special Instructions: Take all medications and inhalers, as prescribed, on the morning of surgery with a sip of water      Insulin Dependent Diabetic patients: Take your diabetic medications as prescribed the day before surgery. Hold all diabetic medications the day of surgery. If you are scheduled to arrive for surgery after 8:00 AM, and your AM blood sugar is >200, please call Ambulatory Surgery. I understand a pre-operative phone call will be made to verify my surgery time. In the event that I am not available, I give permission for a message to be left on my answering service and/or with another person?       Yes      Reviewed instructions via telephone, patient verbalized understanding         ___________________      ___________________      ________________  (Signature of Patient)          (Witness)                   (Date and Time)

## 2022-12-21 ENCOUNTER — ANESTHESIA EVENT (OUTPATIENT)
Dept: SURGERY | Age: 42
End: 2022-12-21
Payer: OTHER MISCELLANEOUS

## 2022-12-22 ENCOUNTER — ANESTHESIA (OUTPATIENT)
Dept: SURGERY | Age: 42
End: 2022-12-22
Payer: OTHER MISCELLANEOUS

## 2022-12-22 ENCOUNTER — HOSPITAL ENCOUNTER (OUTPATIENT)
Age: 42
Setting detail: OUTPATIENT SURGERY
Discharge: HOME | End: 2022-12-22
Attending: ORTHOPAEDIC SURGERY | Admitting: ORTHOPAEDIC SURGERY
Payer: OTHER MISCELLANEOUS

## 2022-12-22 VITALS
HEART RATE: 99 BPM | BODY MASS INDEX: 34.67 KG/M2 | SYSTOLIC BLOOD PRESSURE: 127 MMHG | DIASTOLIC BLOOD PRESSURE: 88 MMHG | RESPIRATION RATE: 16 BRPM | TEMPERATURE: 97.7 F | WEIGHT: 256 LBS | HEIGHT: 72 IN | OXYGEN SATURATION: 94 %

## 2022-12-22 DIAGNOSIS — M79.609 POSTOPERATIVE PAIN OF EXTREMITY: Primary | ICD-10-CM

## 2022-12-22 DIAGNOSIS — G89.18 POSTOPERATIVE PAIN OF EXTREMITY: Primary | ICD-10-CM

## 2022-12-22 PROCEDURE — 74011000250 HC RX REV CODE- 250: Performed by: ORTHOPAEDIC SURGERY

## 2022-12-22 PROCEDURE — 77030002986 HC SUT PROL J&J -A: Performed by: ORTHOPAEDIC SURGERY

## 2022-12-22 PROCEDURE — 77030031139 HC SUT VCRL2 J&J -A: Performed by: ORTHOPAEDIC SURGERY

## 2022-12-22 PROCEDURE — 74011250636 HC RX REV CODE- 250/636: Performed by: ORTHOPAEDIC SURGERY

## 2022-12-22 PROCEDURE — 74011250636 HC RX REV CODE- 250/636: Performed by: ANESTHESIOLOGY

## 2022-12-22 PROCEDURE — 77030003666 HC NDL SPINAL BD -A: Performed by: ORTHOPAEDIC SURGERY

## 2022-12-22 PROCEDURE — 74011250636 HC RX REV CODE- 250/636: Performed by: NURSE ANESTHETIST, CERTIFIED REGISTERED

## 2022-12-22 PROCEDURE — 74011000250 HC RX REV CODE- 250: Performed by: NURSE ANESTHETIST, CERTIFIED REGISTERED

## 2022-12-22 PROCEDURE — 77030040356 HC CORD BPLR FRCP COVD -A: Performed by: ORTHOPAEDIC SURGERY

## 2022-12-22 PROCEDURE — 76210000046 HC AMBSU PH II REC FIRST 0.5 HR: Performed by: ORTHOPAEDIC SURGERY

## 2022-12-22 PROCEDURE — 77030034669 HC SUT FBRLP ARTH -B: Performed by: ORTHOPAEDIC SURGERY

## 2022-12-22 PROCEDURE — 2709999900 HC NON-CHARGEABLE SUPPLY: Performed by: ORTHOPAEDIC SURGERY

## 2022-12-22 PROCEDURE — 77030000032 HC CUF TRNQT ZIMM -B: Performed by: ORTHOPAEDIC SURGERY

## 2022-12-22 PROCEDURE — 76210000034 HC AMBSU PH I REC 0.5 TO 1 HR: Performed by: ORTHOPAEDIC SURGERY

## 2022-12-22 PROCEDURE — 76030000009 HC AMB SURG OR TIME 4.5 TO 5: Performed by: ORTHOPAEDIC SURGERY

## 2022-12-22 PROCEDURE — 77030003601 HC NDL NRV BLK BBMI -A: Performed by: ANESTHESIOLOGY

## 2022-12-22 PROCEDURE — 77030002996 HC SUT SLK J&J -A: Performed by: ORTHOPAEDIC SURGERY

## 2022-12-22 PROCEDURE — 76060000069 HC AMB SURG ANES 4.5 TO 5 HR: Performed by: ORTHOPAEDIC SURGERY

## 2022-12-22 PROCEDURE — 77030010509 HC AIRWY LMA MSK TELE -A: Performed by: ANESTHESIOLOGY

## 2022-12-22 RX ORDER — ONDANSETRON 2 MG/ML
INJECTION INTRAMUSCULAR; INTRAVENOUS AS NEEDED
Status: DISCONTINUED | OUTPATIENT
Start: 2022-12-22 | End: 2022-12-22 | Stop reason: HOSPADM

## 2022-12-22 RX ORDER — DEXMEDETOMIDINE HYDROCHLORIDE 100 UG/ML
INJECTION, SOLUTION INTRAVENOUS AS NEEDED
Status: DISCONTINUED | OUTPATIENT
Start: 2022-12-22 | End: 2022-12-22 | Stop reason: HOSPADM

## 2022-12-22 RX ORDER — MIDAZOLAM HYDROCHLORIDE 1 MG/ML
INJECTION, SOLUTION INTRAMUSCULAR; INTRAVENOUS
Status: COMPLETED
Start: 2022-12-22 | End: 2022-12-22

## 2022-12-22 RX ORDER — SODIUM CHLORIDE, SODIUM LACTATE, POTASSIUM CHLORIDE, CALCIUM CHLORIDE 600; 310; 30; 20 MG/100ML; MG/100ML; MG/100ML; MG/100ML
25 INJECTION, SOLUTION INTRAVENOUS CONTINUOUS
Status: DISCONTINUED | OUTPATIENT
Start: 2022-12-22 | End: 2022-12-22 | Stop reason: HOSPADM

## 2022-12-22 RX ORDER — FENTANYL CITRATE 50 UG/ML
25 INJECTION, SOLUTION INTRAMUSCULAR; INTRAVENOUS
Status: DISCONTINUED | OUTPATIENT
Start: 2022-12-22 | End: 2022-12-22 | Stop reason: HOSPADM

## 2022-12-22 RX ORDER — PROPOFOL 10 MG/ML
INJECTION, EMULSION INTRAVENOUS AS NEEDED
Status: DISCONTINUED | OUTPATIENT
Start: 2022-12-22 | End: 2022-12-22 | Stop reason: HOSPADM

## 2022-12-22 RX ORDER — FENTANYL CITRATE 50 UG/ML
INJECTION, SOLUTION INTRAMUSCULAR; INTRAVENOUS AS NEEDED
Status: DISCONTINUED | OUTPATIENT
Start: 2022-12-22 | End: 2022-12-22 | Stop reason: HOSPADM

## 2022-12-22 RX ORDER — ROPIVACAINE HYDROCHLORIDE 5 MG/ML
INJECTION, SOLUTION EPIDURAL; INFILTRATION; PERINEURAL AS NEEDED
Status: DISCONTINUED | OUTPATIENT
Start: 2022-12-22 | End: 2022-12-22 | Stop reason: HOSPADM

## 2022-12-22 RX ORDER — LIDOCAINE HYDROCHLORIDE 20 MG/ML
INJECTION, SOLUTION EPIDURAL; INFILTRATION; INTRACAUDAL; PERINEURAL AS NEEDED
Status: DISCONTINUED | OUTPATIENT
Start: 2022-12-22 | End: 2022-12-22 | Stop reason: HOSPADM

## 2022-12-22 RX ORDER — DEXAMETHASONE SODIUM PHOSPHATE 4 MG/ML
INJECTION, SOLUTION INTRA-ARTICULAR; INTRALESIONAL; INTRAMUSCULAR; INTRAVENOUS; SOFT TISSUE AS NEEDED
Status: DISCONTINUED | OUTPATIENT
Start: 2022-12-22 | End: 2022-12-22 | Stop reason: HOSPADM

## 2022-12-22 RX ORDER — HYDROMORPHONE HYDROCHLORIDE 1 MG/ML
0.2 INJECTION, SOLUTION INTRAMUSCULAR; INTRAVENOUS; SUBCUTANEOUS
Status: DISCONTINUED | OUTPATIENT
Start: 2022-12-22 | End: 2022-12-22 | Stop reason: HOSPADM

## 2022-12-22 RX ORDER — MIDAZOLAM HYDROCHLORIDE 1 MG/ML
INJECTION, SOLUTION INTRAMUSCULAR; INTRAVENOUS AS NEEDED
Status: DISCONTINUED | OUTPATIENT
Start: 2022-12-22 | End: 2022-12-22 | Stop reason: HOSPADM

## 2022-12-22 RX ORDER — ROPIVACAINE HYDROCHLORIDE 5 MG/ML
30 INJECTION, SOLUTION EPIDURAL; INFILTRATION; PERINEURAL AS NEEDED
Status: DISCONTINUED | OUTPATIENT
Start: 2022-12-22 | End: 2022-12-22 | Stop reason: HOSPADM

## 2022-12-22 RX ORDER — ROPIVACAINE HYDROCHLORIDE 5 MG/ML
INJECTION, SOLUTION EPIDURAL; INFILTRATION; PERINEURAL
Status: COMPLETED
Start: 2022-12-22 | End: 2022-12-22

## 2022-12-22 RX ORDER — PROPOFOL 10 MG/ML
INJECTION, EMULSION INTRAVENOUS
Status: DISCONTINUED | OUTPATIENT
Start: 2022-12-22 | End: 2022-12-22 | Stop reason: HOSPADM

## 2022-12-22 RX ORDER — HYDROCODONE BITARTRATE AND ACETAMINOPHEN 5; 325 MG/1; MG/1
1 TABLET ORAL
Qty: 25 TABLET | Refills: 0 | Status: SHIPPED | OUTPATIENT
Start: 2022-12-22 | End: 2022-12-25

## 2022-12-22 RX ORDER — MIDAZOLAM HYDROCHLORIDE 1 MG/ML
1 INJECTION, SOLUTION INTRAMUSCULAR; INTRAVENOUS AS NEEDED
Status: DISCONTINUED | OUTPATIENT
Start: 2022-12-22 | End: 2022-12-22 | Stop reason: HOSPADM

## 2022-12-22 RX ORDER — OXYCODONE HYDROCHLORIDE 5 MG/1
5 TABLET ORAL AS NEEDED
Status: DISCONTINUED | OUTPATIENT
Start: 2022-12-22 | End: 2022-12-22 | Stop reason: HOSPADM

## 2022-12-22 RX ORDER — ONDANSETRON 2 MG/ML
4 INJECTION INTRAMUSCULAR; INTRAVENOUS AS NEEDED
Status: DISCONTINUED | OUTPATIENT
Start: 2022-12-22 | End: 2022-12-22 | Stop reason: HOSPADM

## 2022-12-22 RX ADMIN — PROPOFOL INJECTABLE EMULSION 150 MG: 10 INJECTION, EMULSION INTRAVENOUS at 08:55

## 2022-12-22 RX ADMIN — PROPOFOL 75 MCG/KG/MIN: 10 INJECTION, EMULSION INTRAVENOUS at 08:51

## 2022-12-22 RX ADMIN — ROPIVACAINE HYDROCHLORIDE 30 ML: 5 INJECTION, SOLUTION EPIDURAL; INFILTRATION; PERINEURAL at 08:05

## 2022-12-22 RX ADMIN — WATER 2 G: 1 INJECTION INTRAMUSCULAR; INTRAVENOUS; SUBCUTANEOUS at 08:59

## 2022-12-22 RX ADMIN — DEXAMETHASONE SODIUM PHOSPHATE 8 MG: 4 INJECTION, SOLUTION INTRAMUSCULAR; INTRAVENOUS at 09:12

## 2022-12-22 RX ADMIN — DEXMEDETOMIDINE HYDROCHLORIDE 6 MCG: 100 INJECTION, SOLUTION, CONCENTRATE INTRAVENOUS at 11:11

## 2022-12-22 RX ADMIN — SODIUM CHLORIDE, POTASSIUM CHLORIDE, SODIUM LACTATE AND CALCIUM CHLORIDE 25 ML/HR: 600; 310; 30; 20 INJECTION, SOLUTION INTRAVENOUS at 08:05

## 2022-12-22 RX ADMIN — DEXMEDETOMIDINE HYDROCHLORIDE 6 MCG: 100 INJECTION, SOLUTION, CONCENTRATE INTRAVENOUS at 12:53

## 2022-12-22 RX ADMIN — PROPOFOL INJECTABLE EMULSION 50 MG: 10 INJECTION, EMULSION INTRAVENOUS at 08:56

## 2022-12-22 RX ADMIN — MIDAZOLAM HYDROCHLORIDE 2 MG: 1 INJECTION, SOLUTION INTRAMUSCULAR; INTRAVENOUS at 08:03

## 2022-12-22 RX ADMIN — WATER 2 G: 1 INJECTION INTRAMUSCULAR; INTRAVENOUS; SUBCUTANEOUS at 13:10

## 2022-12-22 RX ADMIN — PROPOFOL INJECTABLE EMULSION 30 MG: 10 INJECTION, EMULSION INTRAVENOUS at 08:51

## 2022-12-22 RX ADMIN — DEXMEDETOMIDINE HYDROCHLORIDE 6 MCG: 100 INJECTION, SOLUTION, CONCENTRATE INTRAVENOUS at 12:59

## 2022-12-22 RX ADMIN — DEXMEDETOMIDINE HYDROCHLORIDE 6 MCG: 100 INJECTION, SOLUTION, CONCENTRATE INTRAVENOUS at 11:06

## 2022-12-22 RX ADMIN — DEXMEDETOMIDINE HYDROCHLORIDE 8 MCG: 100 INJECTION, SOLUTION, CONCENTRATE INTRAVENOUS at 13:02

## 2022-12-22 RX ADMIN — FENTANYL CITRATE 50 MCG: 50 INJECTION, SOLUTION INTRAMUSCULAR; INTRAVENOUS at 09:12

## 2022-12-22 RX ADMIN — FENTANYL CITRATE 50 MCG: 50 INJECTION, SOLUTION INTRAMUSCULAR; INTRAVENOUS at 10:57

## 2022-12-22 RX ADMIN — ONDANSETRON HYDROCHLORIDE 4 MG: 2 INJECTION, SOLUTION INTRAMUSCULAR; INTRAVENOUS at 12:53

## 2022-12-22 RX ADMIN — LIDOCAINE HYDROCHLORIDE 40 MG: 20 INJECTION, SOLUTION EPIDURAL; INFILTRATION; INTRACAUDAL; PERINEURAL at 08:51

## 2022-12-22 RX ADMIN — MIDAZOLAM HYDROCHLORIDE 2 MG: 1 INJECTION, SOLUTION INTRAMUSCULAR; INTRAVENOUS at 08:48

## 2022-12-22 RX ADMIN — DEXMEDETOMIDINE HYDROCHLORIDE 6 MCG: 100 INJECTION, SOLUTION, CONCENTRATE INTRAVENOUS at 11:01

## 2022-12-22 NOTE — ANESTHESIA PROCEDURE NOTES
Peripheral Block    Start time: 12/22/2022 8:02 AM  End time: 12/22/2022 8:11 AM  Performed by: Hector Dubois MD  Authorized by: Hector Dubois MD       Pre-procedure: Indications: at surgeon's request, post-op pain management and primary anesthetic    Preanesthetic Checklist: patient identified, risks and benefits discussed, site marked, timeout performed, anesthesia consent given and patient being monitored    Timeout Time: 08:02 EST      Block Type:   Block Type:  Supraclavicular  Laterality:  Left  Monitoring:  Standard ASA monitoring, frequent vital sign checks, responsive to questions, oxygen, continuous pulse ox and heart rate  Injection Technique:  Single shot  Procedures: ultrasound guided    Patient Position: seated  Prep: chlorhexidine    Location:  Supraclavicular  Needle Type:  Stimuplex  Needle Gauge:  22 G  Needle Localization:  Ultrasound guidance  Med Admin Time: 12/22/2022 8:15 AM    Assessment:  Number of attempts:  1  Injection Assessment:  Incremental injection every 5 mL, local visualized surrounding nerve on ultrasound, negative aspiration for blood, ultrasound image on chart, no intravascular symptoms and no paresthesia  Patient tolerance:  Patient tolerated the procedure well with no immediate complications  Supraclavicular Nerve Block Note     left Supraclavicular nerve block:    Risks, benefits, alternatives explained at length and patient agrees to proceed. Time out performed and site for block/surgery identified. Standard monitors applied, 3 L NC O2, and sedation given as recorded by RN so as to achieve patient comfort and anxiolysis, but maintain meaningful verbal contact. Sterile prep followed by 1-2 mL local at insertion site. A 40 mm, 22G insulated stimiplex needle was inserted in the interscalene groove, approximately one centimeter from the mid-clavicle. The nerve bundle was identified under [facility] [facility] guidance.     20 ml of 0.5% ropivacaine injected without resistance and with gentle aspiration in 3-5 ml increments. An extremity ring block was performed with 10 ml of same soln. On same side. Patient tolerated procedure well. No pain, paresthesia, or blood noted. VSS throughout. No complications noted.

## 2022-12-22 NOTE — ANESTHESIA POSTPROCEDURE EVALUATION
Procedure(s):  LEFT DISTAL BICEPS REPAIR (AX BLOCK). regional, general    Anesthesia Post Evaluation      Multimodal analgesia: multimodal analgesia used between 6 hours prior to anesthesia start to PACU discharge  Patient location during evaluation: PACU  Patient participation: complete - patient participated  Level of consciousness: sleepy but conscious  Pain management: adequate  Airway patency: patent  Anesthetic complications: no  Cardiovascular status: acceptable, blood pressure returned to baseline and hemodynamically stable  Respiratory status: acceptable and nasal cannula  Hydration status: acceptable  Post anesthesia nausea and vomiting:  none  Final Post Anesthesia Temperature Assessment:  Normothermia (36.0-37.5 degrees C)      INITIAL Post-op Vital signs:   Vitals Value Taken Time   /86 12/22/22 1402   Temp 36.5 °C (97.7 °F) 12/22/22 1340   Pulse 108 12/22/22 1410   Resp 14 12/22/22 1410   SpO2 96 % 12/22/22 1410   Vitals shown include unvalidated device data.

## 2022-12-22 NOTE — DISCHARGE INSTRUCTIONS
Discharge Instructions for:    Eliazar Hatchet / [id number] : 1980    Admitted 2022 Discharged: 2022       Postoperative Hand Surgery Instructions      Elevation:  Elevation is one of the most important things to do following your surgery. Not only does it decrease swelling, but it also decreases pain. For hand or wrist surgery, keep the arm in your sling while up and about, with your hand above your elbow. When lying down, keep your arm propped up on a few pillows, so that your fingers are pointing towards the ceiling. Finger Motion:  **It is very important that you begin moving your fingers immediately after surgery, as often as you can, in order to prevent stiffness. Wiggling your fingers is not the same as moving them - moving your fingers involves bending them until they touch the dressing (if possible). You should use your other hand to gently move the fingers on the operative hand if necessary. You should move your elbow gently through a full range of motion when you can. No lifting anything that weighs more than a pound with the left arm for 2 months. Dressings:  Please leave the surgical dressing in place until you are seen in the office for your first post-operative appointment with Dr Kim Murdock. The dressing helps to prevent infection and positions the extremity to protect the surgical procedure  that was performed. Bathing and showering are allowed as long as the dressing is kept dry. To keep your dressing dry while bathing, simply place a plastic bag (bread bag, newspaper bag, trash bag or subway sandwich bag) over the dressing and seal it with tape or a rubber band. Medications: You have likely been given a prescription for pain medication. Please follow the instructions closely.   It is safe to take up to 600mg of Ibuprofen (Advil or Motrin) along with the pain prescription as long you are not allergic to it, are not on blood thinners, and do not have gastric reflux or an ulcer. However, do not take any additional tylenol/acetaminophen if your prescription contains tylenol. Note that my policy is to give only one prescription for pain medication at the time of the surgery - if you use it up quickly, then you will have no more pain medication left after only a few days, and I will not give you another prescription. So use your pain medication sparingly, and only when necessary! If you have new or increasing numbness after any numbing medicine wears off (12-24 hours for regional blocks, 3 hours for local), call the office immediately. If you experience nausea, vomiting or itching with the pain medication, please call the office during regular office hours. Refills for pain medication prescriptions ARE NOT given on the weekend or after regular office hours. If antibiotics have been prescribed, please be sure to complete the entire prescription. Post-Operative Appointments:  Dr. Carmenza Richter likes to see you back in the office about 10-14 days following your surgery to change the post-operative dressing and remove any necessary sutures or staples. If you have not received a follow up appointment card please contact our office at [phone]. *If you have any questions or concerns or experience any sudden changes in your condition, please call our office at [phone]*       DO NOT TAKE TYLENOL/ACETAMINOPHEN WITH PERCOCET, [address], [address] [address] [facility]. TAKE NARCOTIC PAIN MEDICATIONS WITH FOOD! For the night of surgery, while block is still in effect, start with 1 pain pill at bedtime    Narcotics tend to be constipating and we recommend taking a stool softener such as Colace or Miralax (follow package instructions). If you were given prescriptions, please review the written information on the prescribed medications. DO NOT DRIVE WHILE TAKING NARCOTIC PAIN MEDICATIONS.     CPAP PATIENTS BE SURE TO WEAR MACHINE WHENEVER NAPPING OR SLEEPING DAY/NIGHT OF SURGERY! DISCHARGE SUMMARY from Nurse    The following personal items collected during your admission are returned to you:   Dental Appliance: Dental Appliances: None  Vision: Visual Aid: None  Hearing Aid:    Jewelry: Jewelry: Ring, With patient (stapled to belonging bag)  Clothing: Clothing: With patient  Other Valuables: Other Valuables: None  Valuables sent to safe:        PATIENT INSTRUCTIONS:    After General Anesthesia or Intravenous Sedation, for 24 hours or while taking prescription Narcotics:  Someone should be with you for the next 24 hours. For your own safety, a responsible adult must drive you home. Limit your activities  Recommended activity: Rest today, up with assistance today. Do not climb stairs or shower unattended for the next 24 hours. Start with a soft bland diet and advance as tolerated (no nausea) to regular diet. If you have a sore throat some things that may help are: fluids, warm salt water gargle, or throat lozenges. If this does not improve after several days please follow up with your family physician. Do not drive and operate hazardous machinery  Do not make important personal or business decisions  Do  not drink alcoholic beverages  If you have not urinated within 8 hours after discharge, please contact your surgeon on call. Report the following to your surgeon:  Excessive pain, swelling, redness or odor of or around the surgical area  Temperature over 100.5  Nausea and vomiting lasting longer than 4 hours or if unable to take medications  Any signs of decreased circulation or nerve impairment to extremity: change in color, persistent  numbness, tingling, coldness or increase pain    If you received an upper extremity nerve block, please wear your sling until the block has worn off, then refer to your surgeons post-operative instructions.           If you have had a shoulder block or a block near your collar bone, you may have              symptoms such as:          1. Mild shortness of breath        2. A hoarse voice        3. Blurry vision        4. Unequal pupils        5. Drooping of your face on the same side as the nerve block. These symptoms will disappear as the nerve block wears off. If you received a lower extremity nerve block, please use your crutches, walker, or cane until the nerve block has worn off, then refer to your surgeons post-operative instructions. You will receive a Post Operative Call from one of the Recovery Room Nurses on the day after your surgery to check on you. It is very important for us to know how you are recovering after your surgery. If you have an issue please call your surgeon, do not wait for the post operative call. You may receive an e-mail or letter in the mail from [employer] regarding your experience with us in the Ambulatory Surgery Unit. Your feedback is valuable to us and we appreciate your participation in the survey. If the above instructions are not adequate or you are having problems after your surgery, call your physician at their office number. We wish youre a speedy recovery ? What to do at Home:    *  Please give a list of your current medications to your Primary Care Provider. *  Please update this list whenever your medications are discontinued, doses are      changed, or new medications (including over-the-counter products) are added. *  Please carry medication information at all times in case of emergency situations. If you have not had your influenza or pneumococcal vaccines, please follow up with your primary care physician. The discharge information has been reviewed with the patient and caregiver. The patient and caregiver verbalized understanding. TO PREVENT AN INFECTION      [address] YOUR HANDS    To prevent infection, good handwashing is the most important thing you or your caregiver can do.       Wash your hands with soap and water or use the hand  we gave you before you touch any wounds. SHOWER    Use the antibacterial soap we gave you when you take a shower. Shower with this soap until your wounds are healed. To reach all areas of your body, you may need someone to help you. Dont forget to clean your belly button with every shower. USE CLEAN SHEETS    Use freshly cleaned sheets on your bed after surgery. To keep the surgery site clean, do not allow pets to sleep with you while your wound is still healing. STOP SMOKING    Stop smoking, or at least cut back on smoking    Smoking slows your healing. CONTROL YOUR BLOOD SUGAR    High blood sugars slow wound healing. If you are diabetic, control your blood sugar levels before and after your surgery.

## 2022-12-22 NOTE — PERIOP NOTES
Permission received to review discharge instructions and discuss private health information with wife and will have someone with them after discharge   Patient states that family/friend will be with them for at least 24 hours following today's procedure.    Air Warming blanket placed on pt; turned on for comfort

## 2022-12-22 NOTE — PERIOP NOTES
Dr. Barry Reyes performed nerve block in preop using ultrasound machine to LUE. Pt on CM x3, 02 by NC at 3L, patient responsive when spoken to. Able to answer questions appropriately. Pt did receive 2mg Versed given by Dr. Barry Reyes for sedation. Pt tolerated procedure well.  VSS and will continue to monitor

## 2022-12-22 NOTE — PERIOP NOTES
Pt tolerating liquids, vss.  Pt denies pain. Pt being weaned from oxygen. 1415-D/c instructions reviewed, all questions answered. Reviewed when to call the doctor,diet, activity and hygiene. Reviewed when/what to take for pain meds. Reviewed about nerve block and leg exercises. 1420-Pt ready to go home, oxygen weaned. VSS      1439-Pt transported via w/c to awaiting transportation. No complaints noted at time of d/c home. L arm in sling.

## 2022-12-22 NOTE — PERIOP NOTES
Ally Mixon  1980  [id number]    Situation:  Verbal report given from: AVIS Land CRNA, RN  Procedure: Procedure(s):  LEFT DISTAL BICEPS REPAIR (AX BLOCK)    Background:    Preoperative diagnosis: Biceps tendon rupture, traumatic, left, initial encounter [K69.783C]    Postoperative diagnosis: Biceps tendon rupture, traumatic, left, initial encounter [G72.212W]    :  Dr. Leatha Nolen    Assistant(s): Circ-1: Trent Sebastian RN  Circ-Relief: Gunner Ahumada RN  Scrub Tech-1: Eden Cox  Scrub Tech-Relief: Arvilla Snellen  Surg Asst-1: Teddy Plane    Specimens:   ID Type Source Tests Collected by Time Destination   1 : DISTAL BICEPS TENDON, TENOSYNOVIUM Biopsy Arm, Left CONGO RED BLOOD TEST Klaudia Elam MD 12/22/2022 0353 Other (See Notes)       Assessment:  Intra-procedure medications         Anesthesia gave intra-procedure sedation and medications, see anesthesia flow sheet     Intravenous fluids: LR@ KVO     Vital signs stable       Recommendation:

## 2022-12-22 NOTE — BRIEF OP NOTE
Brief Postoperative Note    Patient: Daniel Williamson  YOB: 1980  MRN: [id number]    Date of Procedure: 12/22/2022     Pre-Op Diagnosis: Biceps tendon rupture, traumatic, left, initial encounter [I11.124I]    Post-Op Diagnosis: Same as preoperative diagnosis.       Procedure(s):  LEFT DISTAL BICEPS REPAIR (AX BLOCK)    Surgeon(s):  Scott Ivy MD    Surgical Assistant: Surg Asst-1: Terra Jeffers    Anesthesia: Other     Estimated Blood Loss (mL): Minimal    Complications: None    Specimens:   ID Type Source Tests Collected by Time Destination   1 : DISTAL BICEPS TENDON, TENOSYNOVIUM Biopsy Arm, Left CONGO RED BLOOD TEST Scott Ivy MD 12/22/2022 4594 Other (See Notes)        Implants: * No implants in log *    Drains: * No LDAs found *    Findings: As above    Electronically Signed by Monika Benjamin MD on 12/22/2022 at 1:40 PM

## 2022-12-23 NOTE — OP NOTES
[address]  OPERATIVE REPORT    Name:  Sapna Martinez  MR#:  [id number]  :  1980  ACCOUNT #:  [de-identified]  DATE OF SERVICE:  2022    PREOPERATIVE DIAGNOSIS:  Distal biceps tendon rupture, left elbow. POSTOPERATIVE DIAGNOSIS:  Distal biceps tendon rupture, left elbow. PROCEDURE PERFORMED:  Repair of left distal biceps tendon rupture. SURGEON:  Monika Benjamin MD    ASSISTANT:  Ida James    ANESTHESIA:  Regional plus general.    COMPLICATIONS:  None. SPECIMENS REMOVED:  There was a specimen of biceps tenosynovium sent for amyloidosis analysis because of the relatively young age of this patient. IMPLANTS:  None. ESTIMATED BLOOD LOSS:  Less than 30 mL. DRAINS:  None. SUMMARY:  The patient was brought into the operating room, placed on the operating table in supine position. Note that the operative site had been identified. Appropriate preoperative antibiotic prophylaxis administered and regional block administered in the preop holding. General anesthetic was administered. The left upper extremity was prepped and draped in the usual manner. After time-out, the limb was elevated, exsanguinated, and Esmarch bandage and tourniquet inflated to 250 mmHg. A hockey-stick shaped incision was made beginning at the volar elbow crease and extending distally down the forearm. Subcutaneous tissues were carefully divided. The lateral antebrachial cutaneous nerve was identified, dissected free from surrounding tissues and protected throughout the procedure. Several large veins had to be tied off and divided for exposure. It was actually fairly difficult to get down to the biceps tuberosity due to the size of this patient's forearm. However, eventually we got down to the biceps tuberosity and exposed it. We then went up to the arm and found the biceps tendon, which had retracted into the arm and folded upon itself.   We pulled it down and used two #2 FiberWire whipstitches to capture the tendon. One of the sutures, I colored purple with the surgical marker to distinguish it from the other. Next, we again exposed the bicipital tuberosity distally. A 2.5-mm drill was used to drill a hole from the anterior surface of the radius to the posterior surface of the radius, aiming ulnarly. An 18-gauge needle was then inserted into each hole and a right-angle clamp used to grab the 18-spinal needle. The stylet was then removed and an 0 Prolene passed down the needle. Holding pressure on the 0 Prolene, the needle was slowly withdrawn and the clamp loosened, then tightened. In this way the 0 Prolene was drawn out the interval between the radius and the ulna, from the posterior hole. Next, one end of each of the sutures was tied with the Prolene and the Prolene used to shuttle these sutures through each hole. Then, while holding tension on one of the sutures, and holding the elbow in about 30 degrees of flexion and full supination, the tendon was brought down into its bed and held in place by tying first the darker sutures to each other and then the lighter ones. An excellent repair was obtained. The patient had full range of motion of his elbow without difficulty after the repair was completed. Now the wound was thoroughly irrigated. The skin was closed with 3-0 nylon suture. Mastisol and Steri-Strips were placed. The tourniquet had been released prior to wound closure. It was inflated, let down at about 110 minutes, re-inflated about 20 minutes later, and let down at 80 minutes. The wound was dressed with 4 x 4s, sterile cast padding, Kerlix and Coban. A sling was placed while he was in the operating room. He tolerated the procedure well, he was taken back to the PACU in stable condition.         Mira Amaro MD      [id number]  D:  12/22/2022 13:51  T:  12/22/2022 23:40  JOB #:  [id number]

## 2022-12-27 ENCOUNTER — OFFICE VISIT (OUTPATIENT)
Dept: INTERNAL MEDICINE CLINIC | Age: 42
End: 2022-12-27
Payer: COMMERCIAL

## 2022-12-27 VITALS
RESPIRATION RATE: 16 BRPM | BODY MASS INDEX: 34.67 KG/M2 | SYSTOLIC BLOOD PRESSURE: 132 MMHG | HEIGHT: 72 IN | TEMPERATURE: 98.4 F | HEART RATE: 93 BPM | WEIGHT: 256 LBS | DIASTOLIC BLOOD PRESSURE: 83 MMHG | OXYGEN SATURATION: 96 %

## 2022-12-27 DIAGNOSIS — Z00.00 ROUTINE GENERAL MEDICAL EXAMINATION AT A HEALTH CARE FACILITY: Primary | ICD-10-CM

## 2022-12-27 PROCEDURE — 99396 PREV VISIT EST AGE 40-64: CPT | Performed by: INTERNAL MEDICINE

## 2022-12-27 RX ORDER — HYDROCODONE BITARTRATE AND IBUPROFEN 5; 200 MG/1; MG/1
1 TABLET ORAL
COMMUNITY

## 2022-12-27 NOTE — PROGRESS NOTES
PROGRESS NOTE  Name: Clarence Rothman   : 1980       ASSESSMENT/ PLAN:     Routine general medical examination at a health care facility:   -     LIPID PANEL; Future  -     METABOLIC PANEL, COMPREHENSIVE; Future  -     CBC WITH AUTOMATED DIFF; Future  -     HEMOGLOBIN A1C WITH EAG; Future  -     LIPID PANEL  -     METABOLIC PANEL, COMPREHENSIVE  -     CBC WITH AUTOMATED DIFF  -     HEMOGLOBIN A1C WITH EAG    Obesity: Discussed diet, exercise. Interested in [facility]. -     liraglutide, weight loss, (SAXENDA) 3 mg/0.5 mL (18 mg/3 mL) pen; 3 mg by SubCUTAneous route daily. , Normal, Disp-1 Each, R-11      Follow-up and Dispositions    Return in about 1 year (around 2023) for CPE. I have reviewed the patient's medications and risks/side effects/benefits were discussed. Diagnosis(-es) explained to patient and questions answered. Literature provided where appropriate. SUBJECTIVE:   Mr. Clarence Rothman is a 43 y.o. male who is here for follow up of routine medical issues. Chief Complaint   Patient presents with    Physical    Weight Management       He saw a dermatologist for the petechial rash of legs and back. Elvie Booth said it was autoimmune response to a virus; They had a name for it.'     He has seen urologist for BPH. At this time, he is otherwise doing well and has brought no other complaints to my attention today. For a list of the medical issues addressed today, see the assessment and plan below. PMH:   Past Medical History:   Diagnosis Date    BPH (benign prostatic hyperplasia)     Tobacco abuse        Past Surgical History:   Procedure Laterality Date    HX OTHER SURGICAL      left shoulder xurgery bone spur    HX VASECTOMY         All: He has no allergies on file. Current Outpatient Medications   Medication Sig    HYDROcodone-ibuprofen (VICOPROFEN) 5-200 mg per tablet Take 1 Tablet by mouth.     AAs/g-acid/gin/gnk/saw p/Sunshine (SV-[id number]-[id number]-[id number]-[id number]-MAU PO) Take  by mouth. No current facility-administered medications for this visit. FH: His family history includes Cancer in his sister; Diabetes in his mother. SH: He is an , for [address]. He reports that he has quit smoking. His smoking use included cigarettes. He smoked an average of .5 packs per day. He has never used smokeless tobacco. He reports current alcohol use of about 12.0 standard drinks per week. He reports that he does not use drugs. ROS: See above; Complete ROS otherwise negative. OBJECTIVE:   Vitals:   Visit Vitals  /83 (BP 1 Location: Left upper arm, BP Patient Position: Sitting, BP Cuff Size: Adult)   Pulse 93   Temp 98.4 °F (36.9 °C) (Temporal)   Resp 16   Ht 6' (1.829 m)   Wt 256 lb (116.1 kg)   SpO2 96%   BMI 34.72 kg/m²      Gen: Pleasant 43 y.o.  male in NAD. HEENT: PERRLA. EOMI. OP moist and pink. Neck: Supple. No LAD. HEART: RRR, No M/G/R.    LUNGS: CTAB No W/R. ABDOMEN: S, NT, ND, BS+. EXTREMITIES: Warm. No C/C/E.  MUSCULOSKELETAL: Normal ROM, muscle strength 5/5 all groups. NEURO: Alert and oriented x 3. Cranial nerves grossly intact. No focal sensory or motor deficits noted. SKIN: Warm. Dry. He has a macular rash of Low back prominently, with scattered lesions on upper back/shoulders, and proximal legs. Lab Results   Component Value Date/Time    Sodium 140 10/20/2021 08:28 AM    Potassium 4.9 10/20/2021 08:28 AM    Chloride 102 10/20/2021 08:28 AM    CO2 26 10/20/2021 08:28 AM    Glucose 89 10/20/2021 08:28 AM    BUN 13 10/20/2021 08:28 AM    Creatinine 1.02 10/20/2021 08:28 AM    BUN/Creatinine ratio 13 10/20/2021 08:28 AM    GFR est  10/20/2021 08:28 AM    GFR est non-AA 92 10/20/2021 08:28 AM    Calcium 10.1 10/20/2021 08:28 AM    Bilirubin, total 0.6 10/20/2021 08:28 AM    ALT (SGPT) 43 10/20/2021 08:28 AM    Alk.  phosphatase 61 10/20/2021 08:28 AM    Protein, total 7.4 10/20/2021 08:28 AM    Albumin 4.9 10/20/2021 08:28 AM    A-G Ratio 2.0 10/20/2021 08:28 AM       Lab Results   Component Value Date/Time    Cholesterol, total 225 (H) 10/20/2021 08:28 AM    HDL Cholesterol 42 10/20/2021 08:28 AM    LDL, calculated 169 (H) 10/20/2021 08:28 AM    LDL, calculated 172 (H) 12/29/2017 08:27 AM    Triglyceride 81 10/20/2021 08:28 AM        Lab Results   Component Value Date/Time    Hemoglobin A1c 5.0 12/29/2017 08:27 AM       Lab Results   Component Value Date/Time    WBC 4.0 10/20/2021 08:28 AM    HGB 16.6 10/20/2021 08:28 AM    HCT 47.2 10/20/2021 08:28 AM    PLATELET 931 85/61/0533 08:28 AM    MCV 91 10/20/2021 08:28 AM

## 2022-12-27 NOTE — PROGRESS NOTES
1. \"Have you been to the ER, urgent care clinic since your last visit? Hospitalized since your last visit? \" Yes Hospital, 12/22, surgery on left bicept    2. \"Have you seen or consulted any other health care providers outside of the [address] [place] [address] since your last visit? \" No     3. For patients aged 39-70: Has the patient had a colonoscopy / FIT/ Cologuard?  NA - based on age

## 2022-12-29 NOTE — H&P
Orthopedic Generic Pre-Op History and Physical    Subjective:     Patient is a 43 y.o.  male presented with a history of ruptured left distal biceps tendon. Onset of symptoms was abrupt with unchanged course since that time. He is being admitted for surgical management of this condition. Patient Active Problem List    Diagnosis Date Noted    Tobacco abuse      Past Medical History:   Diagnosis Date    BPH (benign prostatic hyperplasia)     Tobacco abuse       Past Surgical History:   Procedure Laterality Date    HX OTHER SURGICAL      left shoulder xurgery bone spur    HX OTHER SURGICAL Left     left bicept    HX VASECTOMY        Prior to Admission medications    Medication Sig Start Date End Date Taking? Authorizing Provider   HYDROcodone-ibuprofen (VICOPROFEN) 5-200 mg per tablet Take 1 Tablet by mouth. Provider, Historical   AAs/g-acid/gin/gnk/saw p/Sunshine ([id number]-WQD PO) Take  by mouth. Provider, Historical   liraglutide, weight loss, (SAXENDA) 3 mg/0.5 mL (18 mg/3 mL) pen 3 mg by SubCUTAneous route daily. 12/27/22   Luis Daniel Becerril MD     Not on File   Social History     Tobacco Use    Smoking status: Former     Packs/day: 0.50     Types: Cigarettes    Smokeless tobacco: Never   Substance Use Topics    Alcohol use: Yes     Alcohol/week: 12.0 standard drinks     Types: 4 Glasses of wine, 4 Cans of beer, 4 Shots of liquor per week      Family History   Problem Relation Age of Onset    Diabetes Mother     Cancer Sister         breast          Review of Systems    Objective:     No data found. Physical Exam: Positive hook test left elbow. Weak supination and flexion. N/V exam otherwise intact. Imaging Review  X-rays of the left elbow revealed no abnormality. Assessment:     Active Problems:    * No active hospital problems. *    Left biceps rupture. Plan:     The various methods of treatment have been discussed with the patient and family.    After consideration of risks, benefits and other options for treatment, the patient has consented to surgical interventions (Repair left distal biceps rupture).       Chelle Robledo MD

## 2025-07-14 ENCOUNTER — OFFICE VISIT (OUTPATIENT)
Age: 45
End: 2025-07-14
Payer: COMMERCIAL

## 2025-07-14 VITALS
SYSTOLIC BLOOD PRESSURE: 112 MMHG | WEIGHT: 226.6 LBS | HEIGHT: 72 IN | TEMPERATURE: 98.1 F | HEART RATE: 87 BPM | DIASTOLIC BLOOD PRESSURE: 71 MMHG | OXYGEN SATURATION: 98 % | RESPIRATION RATE: 16 BRPM | BODY MASS INDEX: 30.69 KG/M2

## 2025-07-14 DIAGNOSIS — N40.1 BENIGN PROSTATIC HYPERPLASIA WITH LOWER URINARY TRACT SYMPTOMS, SYMPTOM DETAILS UNSPECIFIED: ICD-10-CM

## 2025-07-14 DIAGNOSIS — Z00.00 ENCOUNTER FOR WELL ADULT EXAM WITHOUT ABNORMAL FINDINGS: Primary | ICD-10-CM

## 2025-07-14 PROCEDURE — 99396 PREV VISIT EST AGE 40-64: CPT | Performed by: INTERNAL MEDICINE

## 2025-07-14 SDOH — ECONOMIC STABILITY: FOOD INSECURITY: WITHIN THE PAST 12 MONTHS, YOU WORRIED THAT YOUR FOOD WOULD RUN OUT BEFORE YOU GOT MONEY TO BUY MORE.: NEVER TRUE

## 2025-07-14 SDOH — ECONOMIC STABILITY: FOOD INSECURITY: WITHIN THE PAST 12 MONTHS, THE FOOD YOU BOUGHT JUST DIDN'T LAST AND YOU DIDN'T HAVE MONEY TO GET MORE.: NEVER TRUE

## 2025-07-14 ASSESSMENT — PATIENT HEALTH QUESTIONNAIRE - PHQ9
SUM OF ALL RESPONSES TO PHQ QUESTIONS 1-9: 0
1. LITTLE INTEREST OR PLEASURE IN DOING THINGS: NOT AT ALL
2. FEELING DOWN, DEPRESSED OR HOPELESS: NOT AT ALL

## 2025-07-14 NOTE — PROGRESS NOTES
PROGRESS NOTE  Name: Abhishek Santos   : 1980       ASSESSMENT/ PLAN:     Routine general medical examination at a health care facility:   -     LIPID PANEL; Future  -     METABOLIC PANEL, COMPREHENSIVE; Future  -     CBC WITH AUTOMATED DIFF; Future  -     HEMOGLOBIN A1C WITH EAG; Future  -     PSA    Obesity: Discussed diet, exercise. He is on Zepbound. Weight is down from peak 262 lb.   Wt Readings from Last 3 Encounters:   25 102.8 kg (226 lb 9.6 oz)   22 116.1 kg (256 lb)   22 119.2 kg (262 lb 12.8 oz)     Return in 1 year (on 2026) for CPE (Physical Exam).     I have reviewed the patient's medications and risks/side effects/benefits were discussed. Diagnosis(-es) explained to patient and questions answered. Literature provided where appropriate.                    SUBJECTIVE:   Mr. Abhishek Santos is a 44 y.o. male who is here for follow up of routine medical issues.      Chief Complaint   Patient presents with    Annual Exam       He is on Zepbound for weight, from an online provider.   Wt Readings from Last 3 Encounters:   25 102.8 kg (226 lb 9.6 oz)   22 116.1 kg (256 lb)   22 119.2 kg (262 lb 12.8 oz)       Prostate issues have subsided. He has seen urologist for BPH in about .     At this time, he is otherwise doing well and has brought no other complaints to my attention today.  For a list of the medical issues addressed today, see the assessment and plan below.    PMH:   Past Medical History:   Diagnosis Date    BPH (benign prostatic hyperplasia)     Tobacco abuse      Past Surgical History:   Procedure Laterality Date    OTHER SURGICAL HISTORY      left shoulder xurgery bone spur    OTHER SURGICAL HISTORY Left     left bicept    SHOULDER SURGERY      VASECTOMY       All: Patient has no known allergies.    Current Outpatient Medications on File Prior to Visit   Medication Sig Dispense Refill    tirzepatide-weight management (ZEPBOUND) 5 MG/0.5ML

## 2025-07-23 ENCOUNTER — HOSPITAL ENCOUNTER (OUTPATIENT)
Facility: HOSPITAL | Age: 45
Discharge: HOME | End: 2025-07-26

## 2025-07-23 DIAGNOSIS — N40.1 BENIGN PROSTATIC HYPERPLASIA WITH LOWER URINARY TRACT SYMPTOMS, SYMPTOM DETAILS UNSPECIFIED: ICD-10-CM

## 2025-07-23 DIAGNOSIS — Z00.00 ENCOUNTER FOR WELL ADULT EXAM WITHOUT ABNORMAL FINDINGS: ICD-10-CM

## 2025-07-23 LAB
ALBUMIN SERPL-MCNC: 4.3 G/DL (ref 3.5–5)
ALBUMIN/GLOB SERPL: 1.3 (ref 1.1–2.2)
ALP SERPL-CCNC: 50 U/L (ref 45–117)
ALT SERPL-CCNC: 41 U/L (ref 12–78)
ANION GAP SERPL CALC-SCNC: 6 MMOL/L (ref 2–12)
AST SERPL-CCNC: 24 U/L (ref 15–37)
BASOPHILS # BLD: 0.02 K/UL (ref 0–0.1)
BASOPHILS NFR BLD: 0.4 % (ref 0–1)
BILIRUB SERPL-MCNC: 1.1 MG/DL (ref 0.2–1)
BUN SERPL-MCNC: 8 MG/DL (ref 6–20)
BUN/CREAT SERPL: 8 (ref 12–20)
CALCIUM SERPL-MCNC: 9.9 MG/DL (ref 8.5–10.1)
CHLORIDE SERPL-SCNC: 103 MMOL/L (ref 97–108)
CHOLEST SERPL-MCNC: 188 MG/DL
CO2 SERPL-SCNC: 27 MMOL/L (ref 21–32)
CREAT SERPL-MCNC: 0.99 MG/DL (ref 0.7–1.3)
DIFFERENTIAL METHOD BLD: NORMAL
EOSINOPHIL # BLD: 0.13 K/UL (ref 0–0.4)
EOSINOPHIL NFR BLD: 2.8 % (ref 0–7)
ERYTHROCYTE [DISTWIDTH] IN BLOOD BY AUTOMATED COUNT: 12.1 % (ref 11.5–14.5)
GLOBULIN SER CALC-MCNC: 3.3 G/DL (ref 2–4)
GLUCOSE SERPL-MCNC: 89 MG/DL (ref 65–100)
HCT VFR BLD AUTO: 46.2 % (ref 36.6–50.3)
HDLC SERPL-MCNC: 57 MG/DL
HDLC SERPL: 3.3 (ref 0–5)
HGB BLD-MCNC: 16.1 G/DL (ref 12.1–17)
HIV 1+2 AB+HIV1 P24 AG SERPL QL IA: NONREACTIVE
HIV 1/2 RESULT COMMENT: NORMAL
IMM GRANULOCYTES # BLD AUTO: 0.01 K/UL (ref 0–0.04)
IMM GRANULOCYTES NFR BLD AUTO: 0.2 % (ref 0–0.5)
LDLC SERPL CALC-MCNC: 116.2 MG/DL (ref 0–100)
LYMPHOCYTES # BLD: 1.14 K/UL (ref 0.8–3.5)
LYMPHOCYTES NFR BLD: 24.5 % (ref 12–49)
MCH RBC QN AUTO: 30.8 PG (ref 26–34)
MCHC RBC AUTO-ENTMCNC: 34.8 G/DL (ref 30–36.5)
MCV RBC AUTO: 88.3 FL (ref 80–99)
MONOCYTES # BLD: 0.48 K/UL (ref 0–1)
MONOCYTES NFR BLD: 10.3 % (ref 5–13)
NEUTS SEG # BLD: 2.87 K/UL (ref 1.8–8)
NEUTS SEG NFR BLD: 61.8 % (ref 32–75)
NRBC # BLD: 0 K/UL (ref 0–0.01)
NRBC BLD-RTO: 0 PER 100 WBC
PLATELET # BLD AUTO: 294 K/UL (ref 150–400)
PMV BLD AUTO: 9.7 FL (ref 8.9–12.9)
POTASSIUM SERPL-SCNC: 4.2 MMOL/L (ref 3.5–5.1)
PROT SERPL-MCNC: 7.6 G/DL (ref 6.4–8.2)
RBC # BLD AUTO: 5.23 M/UL (ref 4.1–5.7)
SODIUM SERPL-SCNC: 136 MMOL/L (ref 136–145)
TRIGL SERPL-MCNC: 74 MG/DL
VLDLC SERPL CALC-MCNC: 14.8 MG/DL
WBC # BLD AUTO: 4.7 K/UL (ref 4.1–11.1)

## 2025-07-24 LAB
PSA FREE MFR SERPL: 30.9 %
PSA FREE SERPL-MCNC: 0.34 NG/ML
PSA SERPL-MCNC: 1.1 NG/ML (ref 0–4)

## 2025-07-25 ENCOUNTER — RESULTS FOLLOW-UP (OUTPATIENT)
Age: 45
End: 2025-07-25

## (undated) DEVICE — SPONGE GZ W4XL4IN COT RADPQ HIGHLY ABSRB

## (undated) DEVICE — SUTURE VCRL RAPIDE SZ 3-0 L18IN ABSRB UD PS-2 L19MM 3/8 CIR VR497

## (undated) DEVICE — SOLUTION IRRIG 1000ML 0.9% SOD CHL USP POUR PLAS BTL

## (undated) DEVICE — MARKER,SKIN,WI/RULER AND LABELS: Brand: MEDLINE

## (undated) DEVICE — SUTURE FIBERLOOP SZ 2-0 L24IN NONABSORBABLE BLU L26.2MM 3/8 AR723202

## (undated) DEVICE — TUBING, SUCTION, 1/4" X 10', STRAIGHT: Brand: MEDLINE

## (undated) DEVICE — GLOVE SURG SZ 8 L12IN FNGR THK79MIL GRN LTX FREE

## (undated) DEVICE — NEEDLE SUT SZ 4 MAYO CATGUT 1/2 CIR TAPR PNT DISP

## (undated) DEVICE — SUT SLK 3-0 30IN TIE MP BLK --

## (undated) DEVICE — TOWEL SURG W17XL27IN STD BLU COT NONFENESTRATED PREWASHED

## (undated) DEVICE — SYR 10ML LUER LOK 1/5ML GRAD --

## (undated) DEVICE — GLOVE ORANGE PI 8   MSG9080

## (undated) DEVICE — ZIMMER® STERILE DISPOSABLE TOURNIQUET CUFF WITH PLC, DUAL PORT, SINGLE BLADDER, 18 IN. (46 CM)

## (undated) DEVICE — SUT PROL 0 30IN CT1 BLU --

## (undated) DEVICE — SUTURE PERMAHAND SZ 4-0 L12X30IN NONABSORBABLE BLK SILK A303H

## (undated) DEVICE — HAND-MRMCASU: Brand: MEDLINE INDUSTRIES, INC.

## (undated) DEVICE — NDL SPNE QNCKE 18GX3.5IN LF --

## (undated) DEVICE — SOL TOP ADH MASTISOL 2/3ML VI -- NDC#00496052348

## (undated) DEVICE — BIPOLAR FORCEPS CORD: Brand: VALLEYLAB

## (undated) DEVICE — BANDAGE,GAUZE,BULKEE II,4.5"X4.1YD,STRL: Brand: MEDLINE